# Patient Record
Sex: MALE | Race: WHITE | Employment: FULL TIME | URBAN - METROPOLITAN AREA
[De-identification: names, ages, dates, MRNs, and addresses within clinical notes are randomized per-mention and may not be internally consistent; named-entity substitution may affect disease eponyms.]

---

## 2017-02-16 ENCOUNTER — OFFICE VISIT (OUTPATIENT)
Dept: FAMILY MEDICINE CLINIC | Facility: CLINIC | Age: 32
End: 2017-02-16

## 2017-02-16 VITALS
BODY MASS INDEX: 22.19 KG/M2 | HEIGHT: 72.4 IN | SYSTOLIC BLOOD PRESSURE: 100 MMHG | HEART RATE: 76 BPM | RESPIRATION RATE: 14 BRPM | WEIGHT: 165.63 LBS | DIASTOLIC BLOOD PRESSURE: 70 MMHG

## 2017-02-16 DIAGNOSIS — F41.9 ANXIETY: ICD-10-CM

## 2017-02-16 DIAGNOSIS — G47.9 SLEEP TROUBLE: Primary | ICD-10-CM

## 2017-02-16 PROCEDURE — 99214 OFFICE O/P EST MOD 30 MIN: CPT | Performed by: FAMILY MEDICINE

## 2017-02-16 RX ORDER — TEMAZEPAM 15 MG/1
15 CAPSULE ORAL NIGHTLY PRN
Qty: 30 CAPSULE | Refills: 2 | Status: SHIPPED | OUTPATIENT
Start: 2017-02-16 | End: 2017-10-18

## 2017-02-16 NOTE — PROGRESS NOTES
Patient presents with: Insomnia: started Ambien- not working - still hacing trouble staying asleep    HPI:   Chetna Mc is a 28year old male who presents to the office for continued sleeping issues. Has been about 4 years now.   Started wit trouble  Ineffective other meds  Trial of temazepam, which may help address some of the anxiety. RTC 3 months if working for refill. If not effective, may need therapist/couseling specifically for sleep  - temazepam 15 MG Oral Cap;  Take 1 capsule (15 m

## 2017-05-04 ENCOUNTER — OFFICE VISIT (OUTPATIENT)
Dept: FAMILY MEDICINE CLINIC | Facility: CLINIC | Age: 32
End: 2017-05-04

## 2017-05-04 VITALS
SYSTOLIC BLOOD PRESSURE: 118 MMHG | HEART RATE: 78 BPM | WEIGHT: 172 LBS | TEMPERATURE: 98 F | DIASTOLIC BLOOD PRESSURE: 76 MMHG | RESPIRATION RATE: 16 BRPM | HEIGHT: 73 IN | BODY MASS INDEX: 22.8 KG/M2 | OXYGEN SATURATION: 98 %

## 2017-05-04 DIAGNOSIS — R09.82 POST-NASAL DRIP: ICD-10-CM

## 2017-05-04 DIAGNOSIS — R05.9 COUGH: ICD-10-CM

## 2017-05-04 DIAGNOSIS — J34.89 SINUS PRESSURE: Primary | ICD-10-CM

## 2017-05-04 PROCEDURE — 99213 OFFICE O/P EST LOW 20 MIN: CPT | Performed by: NURSE PRACTITIONER

## 2017-05-04 RX ORDER — FLUTICASONE PROPIONATE 50 MCG
1 SPRAY, SUSPENSION (ML) NASAL 2 TIMES DAILY
Qty: 1 BOTTLE | Refills: 1 | Status: SHIPPED | OUTPATIENT
Start: 2017-05-04 | End: 2017-06-03

## 2017-05-04 NOTE — PROGRESS NOTES
HPI:   Arjun Fitzgerald is a 28year old male who presents with ill symptoms for  2  weeks.  Patient reports sore throat, congestion, yellow/green colored nasal discharge, cough with clear colored sputum, cough is not keeping pt up at night, ear caleb developed, well nourished,in no apparent distress  HEENT: atraumatic, normocephalic,ears full and clear, nares with minimal mucus, sniffling often, throat with no erythema or edema. No sinus tenderness with palpation.   NECK: supple,no adenopathy  LUNGS: cl

## 2017-05-04 NOTE — PATIENT INSTRUCTIONS
· Please start over the counter Allegra or Claritin daily (generic okay). Use for at least one week to help with allergies. If improved may stop; if symptoms return please restart.   Please start Flonase 1 spray each nostril twice daily before brushing teet

## 2017-10-18 ENCOUNTER — HOSPITAL ENCOUNTER (OUTPATIENT)
Age: 32
Discharge: HOME | End: 2017-10-18
Attending: FAMILY MEDICINE
Payer: COMMERCIAL

## 2017-10-18 ENCOUNTER — OFFICE VISIT (OUTPATIENT)
Dept: FAMILY MEDICINE CLINIC | Facility: CLINIC | Age: 32
End: 2017-10-18

## 2017-10-18 VITALS
TEMPERATURE: 98 F | SYSTOLIC BLOOD PRESSURE: 120 MMHG | HEART RATE: 70 BPM | DIASTOLIC BLOOD PRESSURE: 83 MMHG | OXYGEN SATURATION: 100 % | WEIGHT: 171.94 LBS | RESPIRATION RATE: 16 BRPM | BODY MASS INDEX: 23 KG/M2

## 2017-10-18 DIAGNOSIS — L98.9 SKIN LESION: Primary | ICD-10-CM

## 2017-10-18 DIAGNOSIS — A69.20 ERYTHEMA MIGRANS (LYME DISEASE): Primary | ICD-10-CM

## 2017-10-18 PROCEDURE — 99213 OFFICE O/P EST LOW 20 MIN: CPT

## 2017-10-18 PROCEDURE — 99214 OFFICE O/P EST MOD 30 MIN: CPT

## 2017-10-18 RX ORDER — DOXYCYCLINE HYCLATE 100 MG/1
100 CAPSULE ORAL 2 TIMES DAILY
Qty: 42 CAPSULE | Refills: 0 | Status: SHIPPED | OUTPATIENT
Start: 2017-10-18 | End: 2017-11-08

## 2017-10-18 NOTE — ED PROVIDER NOTES
Patient Seen in: [facility]    History   Patient presents with:  Bite Sting,Insect (integumentary)    Stated Complaint: insect bites x2 weeks    HPI  29 yo M here with complaints of insect bites - small black oval shaped bu 98.3 °F (36.8 °C)  Temp src: Oral  SpO2: 100 %  O2 Device: None (Room air)    Current:/83   Pulse 70   Temp 98.3 °F (36.8 °C) (Oral)   Resp 16   Wt 78 kg   SpO2 100%   BMI 22.69 kg/m²         Physical Exam    GEN: Not in any acute distress, making go indicated considering you have no other symptoms     Start taking probiotics - OTC Culturelle / Florastor to avoid any GI distress from the antibiotics       Follow up with PMD in the next 5-7 days           Disposition and Plan     Clinical Impression:  E

## 2017-10-18 NOTE — PROGRESS NOTES
CHIEF COMPLAINT:   Patient presents with:  Derm Problem: bug bite     HPI:   Robinson Alvarez is a 28year old male who presents for evaluation of a bug bite. Per patient bug bites started 2 weeks ago.  These 3 bug bites have now developed rings ar numbness. EXAM:   There were no vitals taken for this visit.   GENERAL: well developed, well nourished,in no apparent distress  SKIN: right forearm 2 papular lesions raised erythematous defined circular boarders, 1 similar lesion to dorsum of left fore

## 2017-10-18 NOTE — ED INITIAL ASSESSMENT (HPI)
[place] approx 2 weeks ago, noticed insect on r hand, brushed it off states bite was painful but did not notice to be tick,  Noticed bites to r forearm & left elbow day or 2 later, now has rings around all 3 marks.   No oral

## 2017-10-31 ENCOUNTER — PATIENT MESSAGE (OUTPATIENT)
Dept: FAMILY MEDICINE CLINIC | Facility: CLINIC | Age: 32
End: 2017-10-31

## 2017-10-31 NOTE — TELEPHONE ENCOUNTER
From: Nathalia Arevalo  To: Jessica Henson MD  Sent: 10/31/2017 8:04 AM CDT  Subject: Prescription Question    Hi Doc,    I was prescribed doxycycline (100mg, 2x/day for 21 days) on 10/18 for lymes, presenting only with the \"target\" rash (no f

## 2018-06-11 ENCOUNTER — OFFICE VISIT (OUTPATIENT)
Dept: FAMILY MEDICINE CLINIC | Facility: CLINIC | Age: 33
End: 2018-06-11

## 2018-06-11 VITALS
OXYGEN SATURATION: 98 % | RESPIRATION RATE: 14 BRPM | HEART RATE: 84 BPM | TEMPERATURE: 98 F | SYSTOLIC BLOOD PRESSURE: 110 MMHG | DIASTOLIC BLOOD PRESSURE: 68 MMHG | WEIGHT: 165 LBS | HEIGHT: 74 IN | BODY MASS INDEX: 21.17 KG/M2

## 2018-06-11 DIAGNOSIS — J34.89 NASAL VESTIBULITIS: Primary | ICD-10-CM

## 2018-06-11 PROCEDURE — 99213 OFFICE O/P EST LOW 20 MIN: CPT | Performed by: NURSE PRACTITIONER

## 2018-06-11 NOTE — PATIENT INSTRUCTIONS
Avoid touching area  May use a Q-tip when applying Mupirocin   Mupirocin as prescribed  May use cool compresses or ibuprofen for discomfort. If inflammation gets worse, follow-up with PCP or clinic.

## 2018-06-11 NOTE — PROGRESS NOTES
CHIEF COMPLAINT:   Patient presents with:  Swelling: pt c/o tenderness and swelling in nose x  2dys      HPI:   Chetna Mc is a 33year old male who presents for complaints of tenderness and swelling inside right nares for  2 days.   Bethel normocephalic.  no tenderness on palpation of sinuses  EYES: conjunctiva clear, EOM intact  EARS: TM's clear, no bulging, no retraction, no fluid, bony landmarks visualized. NOSE: Nostrils patent, right nares with 3 erythematous ulcers on septal side.

## 2019-12-16 ENCOUNTER — OFFICE VISIT (OUTPATIENT)
Dept: FAMILY MEDICINE CLINIC | Facility: CLINIC | Age: 34
End: 2019-12-16
Payer: COMMERCIAL

## 2019-12-16 VITALS
DIASTOLIC BLOOD PRESSURE: 68 MMHG | HEART RATE: 76 BPM | RESPIRATION RATE: 16 BRPM | SYSTOLIC BLOOD PRESSURE: 116 MMHG | HEIGHT: 74 IN | OXYGEN SATURATION: 97 % | BODY MASS INDEX: 23.23 KG/M2 | WEIGHT: 181 LBS | TEMPERATURE: 98 F

## 2019-12-16 DIAGNOSIS — B35.4 TINEA CORPORIS: Primary | ICD-10-CM

## 2019-12-16 PROCEDURE — 99213 OFFICE O/P EST LOW 20 MIN: CPT | Performed by: NURSE PRACTITIONER

## 2019-12-16 NOTE — PROGRESS NOTES
CHIEF COMPLAINT:   Patient presents with:  Rash: rash on left hand ( 1 month)          HPI:    Mayra Mendes is a 29year old male who presents for evaluation of a rash. Per patient rash started in the past 1  months.  Rash has been persistent s HEENT: Denies rhinorrhea, edema of the lips or swelling of throat. CARDIOVASCULAR: Denies chest pains or palpitations. LUNGS: Denies shortness of breath with exertion or rest. No cough or wheezing. LYMPH: Denies enlargement of the lymph nodes.     EXAM: The medical term for ringworm is tinea. It can affect most parts of your body, although it seems to do better in moist areas of the body and around hair.  It can be on almost any part of your body, including:  · Arms, hands, legs, chest, feet, and back  · S · Keep it from spreading to others. Untreated ringworm of the skin is contagious by skin-to-skin contact. Your child may return to school 2 days after treatment has started.   Prevention  To some degree, prevention depends on what part of your body was affe

## 2020-01-13 ENCOUNTER — TELEPHONE (OUTPATIENT)
Dept: FAMILY MEDICINE CLINIC | Facility: CLINIC | Age: 35
End: 2020-01-13

## 2020-01-13 NOTE — TELEPHONE ENCOUNTER
Pt requesting old medical records that shows pt's signature on HIPPA form or facesheet. Put signed forms in bottom drawer for 2014, & 2015. Pt will  information from the office.

## 2020-01-29 ENCOUNTER — OFFICE VISIT (OUTPATIENT)
Dept: FAMILY MEDICINE CLINIC | Facility: CLINIC | Age: 35
End: 2020-01-29
Payer: COMMERCIAL

## 2020-01-29 VITALS
HEIGHT: 74 IN | SYSTOLIC BLOOD PRESSURE: 104 MMHG | RESPIRATION RATE: 16 BRPM | TEMPERATURE: 98 F | DIASTOLIC BLOOD PRESSURE: 70 MMHG | WEIGHT: 179 LBS | BODY MASS INDEX: 22.97 KG/M2 | HEART RATE: 88 BPM

## 2020-01-29 DIAGNOSIS — B35.4 TINEA CORPORIS: Primary | ICD-10-CM

## 2020-01-29 PROCEDURE — 99213 OFFICE O/P EST LOW 20 MIN: CPT | Performed by: FAMILY MEDICINE

## 2020-01-29 RX ORDER — ZOLPIDEM TARTRATE 6.25 MG/1
6.25 TABLET, FILM COATED, EXTENDED RELEASE ORAL AS NEEDED
COMMUNITY
Start: 2020-01-10 | End: 2021-09-13

## 2020-01-29 RX ORDER — CLOTRIMAZOLE AND BETAMETHASONE DIPROPIONATE 10; .64 MG/G; MG/G
1 CREAM TOPICAL 2 TIMES DAILY PRN
Qty: 60 G | Refills: 1 | Status: SHIPPED | OUTPATIENT
Start: 2020-01-29 | End: 2020-08-25

## 2020-01-29 RX ORDER — HYDROXYZINE HYDROCHLORIDE 25 MG/1
25 TABLET, FILM COATED ORAL AS NEEDED
COMMUNITY
Start: 2020-01-10 | End: 2021-09-13

## 2020-01-29 RX ORDER — TERBINAFINE HYDROCHLORIDE 250 MG/1
250 TABLET ORAL DAILY
Qty: 21 TABLET | Refills: 0 | Status: SHIPPED | OUTPATIENT
Start: 2020-01-29 | End: 2020-02-19

## 2020-01-29 NOTE — PROGRESS NOTES
Patient presents with:  Derm Problem: Pt c/o itchy, dry rash on tops of both hands. HPI:   Christopher Velarde is a 36year old male who presents to the office for eval of rash on his hands. H/o ring worm on hand.   Given econazole cream and it

## 2020-07-22 ENCOUNTER — LAB ENCOUNTER (OUTPATIENT)
Dept: LAB | Facility: HOSPITAL | Age: 35
End: 2020-07-22
Attending: NURSE PRACTITIONER
Payer: COMMERCIAL

## 2020-07-22 ENCOUNTER — TELEMEDICINE (OUTPATIENT)
Dept: TELEHEALTH | Age: 35
End: 2020-07-22

## 2020-07-22 DIAGNOSIS — Z20.822 SUSPECTED COVID-19 VIRUS INFECTION: ICD-10-CM

## 2020-07-22 DIAGNOSIS — Z20.822 SUSPECTED COVID-19 VIRUS INFECTION: Primary | ICD-10-CM

## 2020-07-22 PROCEDURE — 99213 OFFICE O/P EST LOW 20 MIN: CPT | Performed by: NURSE PRACTITIONER

## 2020-07-22 NOTE — PATIENT INSTRUCTIONS
Coronavirus Disease 2019 (COVID-19)     [place] [address] is committed to the safety and well-being of our patients, members, employees, and communities.  As concerns arise about the new strain of coronavirus that causes COVID-19, [place] [address] your household, like dishes, towels, and bedding   10. Clean all surfaces that are touched often, like counters, tabletops, and doorknobs. Use household cleaning sprays or wipes according to the label instructions.       Patients with confirmed COVID-19 ann marie symptoms started, OR until 72 hours after your fever is gone and symptoms are getting better, whichever is longer.     Additional Information      You can also get more information at the following websites:   Centers for Disease Control & Prevention (CDC)

## 2020-07-22 NOTE — PROGRESS NOTES
Morales Cardozo is a 36year old male here today for a telemedicine/video visit. Virtual/Telephone Check-In    Morales Cardozo verbally consented to a [employer] on 07/22/20.   Patient has been referred to the [facility] History   Problem Relation Age of Onset   • Diabetes Paternal Grandfather    • Stroke Paternal Grandfather    • Cancer Neg    • Heart Disease Neg       Social History    Tobacco Use      Smoking status: Former Smoker        Packs/day: 0.25        Years: 10

## 2020-07-24 LAB — SARS-COV-2 RNA RESP QL NAA+PROBE: NOT DETECTED

## 2020-07-28 ENCOUNTER — TELEPHONE (OUTPATIENT)
Dept: FAMILY MEDICINE CLINIC | Facility: CLINIC | Age: 35
End: 2020-07-28

## 2020-07-28 DIAGNOSIS — Z13.220 LIPID SCREENING: Primary | ICD-10-CM

## 2020-07-28 DIAGNOSIS — R53.83 FATIGUE, UNSPECIFIED TYPE: ICD-10-CM

## 2020-07-28 DIAGNOSIS — Z13.21 ENCOUNTER FOR VITAMIN DEFICIENCY SCREENING: ICD-10-CM

## 2020-07-28 DIAGNOSIS — F41.1 ANXIETY STATE: ICD-10-CM

## 2020-07-28 NOTE — TELEPHONE ENCOUNTER
Vit D typically does not reflect energy or fatigue  I can order, will need to find out if insurance will cover the testing. Labs have been ordered. Please get them fasting prior to the appt.   thanks

## 2020-07-28 NOTE — TELEPHONE ENCOUNTER
Please enter lab orders for the patient's upcoming physical appointment. Physical scheduled:    Your appointments     Date & Time Appointment Department [facility])    Aug 25, 2020  8:30 AM CDT Physical - Established with Alcira Mars MD [address] [place] [address]

## 2020-07-28 NOTE — TELEPHONE ENCOUNTER
Spoke to patient to let him know and patient stated he did not specify he needed a Vitamin \"D\" test.    Pt wants to know what additional labs should be ordered to determine why his energy levels are low and why he feels fatigued all the time.  Patient sta

## 2020-07-28 NOTE — TELEPHONE ENCOUNTER
Labs ordered to do a workup for his symptoms. He will jie to be fasting, but can get anytime.   Preferably a few days prior to our vist.

## 2020-07-29 ENCOUNTER — PATIENT MESSAGE (OUTPATIENT)
Dept: FAMILY MEDICINE CLINIC | Facility: CLINIC | Age: 35
End: 2020-07-29

## 2020-07-29 NOTE — TELEPHONE ENCOUNTER
Explained to Pt what labs were ordered and that those labs will give us a good picture of possible reasons for symptoms and that other test can be done pending the results. CBC, CMP, Lipid.  Vit D and TSH were ordered    Pt verbalized understanding

## 2020-07-29 NOTE — TELEPHONE ENCOUNTER
Called patient  And notified, patient was upset states this was not what he was asking, states will send Dr Doe Dates a Telanetixt message.

## 2020-07-29 NOTE — TELEPHONE ENCOUNTER
From: Jann Aguilera  To: Kenan Walker MD  Sent: 7/29/2020 8:59 AM CDT  Subject: Other    Hi Dr Jason Loyd,    I think there's been a miscommunication regarding my request for blood work.     I would like to get the blood work done asap since now

## 2020-07-31 ENCOUNTER — LAB ENCOUNTER (OUTPATIENT)
Dept: LAB | Age: 35
End: 2020-07-31
Attending: FAMILY MEDICINE
Payer: COMMERCIAL

## 2020-07-31 DIAGNOSIS — Z13.21 ENCOUNTER FOR VITAMIN DEFICIENCY SCREENING: ICD-10-CM

## 2020-07-31 DIAGNOSIS — Z13.220 LIPID SCREENING: ICD-10-CM

## 2020-07-31 DIAGNOSIS — R53.83 FATIGUE, UNSPECIFIED TYPE: ICD-10-CM

## 2020-07-31 LAB
ALBUMIN SERPL-MCNC: 4.6 G/DL (ref 3.4–5)
ALBUMIN/GLOB SERPL: 1.2 {RATIO} (ref 1–2)
ALP LIVER SERPL-CCNC: 56 U/L (ref 45–117)
ALT SERPL-CCNC: 40 U/L (ref 16–61)
ANION GAP SERPL CALC-SCNC: 4 MMOL/L (ref 0–18)
AST SERPL-CCNC: 19 U/L (ref 15–37)
BASOPHILS # BLD AUTO: 0.07 X10(3) UL (ref 0–0.2)
BASOPHILS NFR BLD AUTO: 0.9 %
BILIRUB SERPL-MCNC: 0.6 MG/DL (ref 0.1–2)
BUN BLD-MCNC: 13 MG/DL (ref 7–18)
BUN/CREAT SERPL: 14 (ref 10–20)
CALCIUM BLD-MCNC: 9.4 MG/DL (ref 8.5–10.1)
CHLORIDE SERPL-SCNC: 107 MMOL/L (ref 98–112)
CHOLEST SMN-MCNC: 167 MG/DL (ref ?–200)
CO2 SERPL-SCNC: 26 MMOL/L (ref 21–32)
CREAT BLD-MCNC: 0.93 MG/DL (ref 0.7–1.3)
DEPRECATED RDW RBC AUTO: 42.5 FL (ref 35.1–46.3)
EOSINOPHIL # BLD AUTO: 0.18 X10(3) UL (ref 0–0.7)
EOSINOPHIL NFR BLD AUTO: 2.3 %
ERYTHROCYTE [DISTWIDTH] IN BLOOD BY AUTOMATED COUNT: 12.5 % (ref 11–15)
GLOBULIN PLAS-MCNC: 3.8 G/DL (ref 2.8–4.4)
GLUCOSE BLD-MCNC: 85 MG/DL (ref 70–99)
HCT VFR BLD AUTO: 46.7 % (ref 39–53)
HDLC SERPL-MCNC: 37 MG/DL (ref 40–59)
HGB BLD-MCNC: 15.7 G/DL (ref 13–17.5)
IMM GRANULOCYTES # BLD AUTO: 0.04 X10(3) UL (ref 0–1)
IMM GRANULOCYTES NFR BLD: 0.5 %
LDLC SERPL CALC-MCNC: 92 MG/DL (ref ?–100)
LYMPHOCYTES # BLD AUTO: 2.8 X10(3) UL (ref 1–4)
LYMPHOCYTES NFR BLD AUTO: 35.1 %
M PROTEIN MFR SERPL ELPH: 8.4 G/DL (ref 6.4–8.2)
MCH RBC QN AUTO: 31.1 PG (ref 26–34)
MCHC RBC AUTO-ENTMCNC: 33.6 G/DL (ref 31–37)
MCV RBC AUTO: 92.5 FL (ref 80–100)
MONOCYTES # BLD AUTO: 0.67 X10(3) UL (ref 0.1–1)
MONOCYTES NFR BLD AUTO: 8.4 %
NEUTROPHILS # BLD AUTO: 4.21 X10 (3) UL (ref 1.5–7.7)
NEUTROPHILS # BLD AUTO: 4.21 X10(3) UL (ref 1.5–7.7)
NEUTROPHILS NFR BLD AUTO: 52.8 %
NONHDLC SERPL-MCNC: 130 MG/DL (ref ?–130)
OSMOLALITY SERPL CALC.SUM OF ELEC: 283 MOSM/KG (ref 275–295)
PATIENT FASTING Y/N/NP: YES
PATIENT FASTING Y/N/NP: YES
PLATELET # BLD AUTO: 278 10(3)UL (ref 150–450)
POTASSIUM SERPL-SCNC: 4.2 MMOL/L (ref 3.5–5.1)
RBC # BLD AUTO: 5.05 X10(6)UL (ref 4.3–5.7)
SODIUM SERPL-SCNC: 137 MMOL/L (ref 136–145)
TRIGL SERPL-MCNC: 189 MG/DL (ref 30–149)
TSI SER-ACNC: 1.05 MIU/ML (ref 0.36–3.74)
VIT D+METAB SERPL-MCNC: 28.9 NG/ML (ref 30–100)
VLDLC SERPL CALC-MCNC: 38 MG/DL (ref 0–30)
WBC # BLD AUTO: 8 X10(3) UL (ref 4–11)

## 2020-07-31 PROCEDURE — 80050 GENERAL HEALTH PANEL: CPT | Performed by: FAMILY MEDICINE

## 2020-07-31 PROCEDURE — 82306 VITAMIN D 25 HYDROXY: CPT | Performed by: FAMILY MEDICINE

## 2020-07-31 PROCEDURE — 80061 LIPID PANEL: CPT | Performed by: FAMILY MEDICINE

## 2020-08-25 ENCOUNTER — OFFICE VISIT (OUTPATIENT)
Dept: FAMILY MEDICINE CLINIC | Facility: CLINIC | Age: 35
End: 2020-08-25
Payer: COMMERCIAL

## 2020-08-25 VITALS
RESPIRATION RATE: 20 BRPM | DIASTOLIC BLOOD PRESSURE: 72 MMHG | TEMPERATURE: 98 F | BODY MASS INDEX: 22.29 KG/M2 | SYSTOLIC BLOOD PRESSURE: 124 MMHG | WEIGHT: 168.19 LBS | HEIGHT: 72.75 IN | HEART RATE: 96 BPM

## 2020-08-25 DIAGNOSIS — Z00.00 ANNUAL PHYSICAL EXAM: Primary | ICD-10-CM

## 2020-08-25 PROCEDURE — 3008F BODY MASS INDEX DOCD: CPT | Performed by: FAMILY MEDICINE

## 2020-08-25 PROCEDURE — 99395 PREV VISIT EST AGE 18-39: CPT | Performed by: FAMILY MEDICINE

## 2020-08-25 PROCEDURE — 3074F SYST BP LT 130 MM HG: CPT | Performed by: FAMILY MEDICINE

## 2020-08-25 PROCEDURE — 3078F DIAST BP <80 MM HG: CPT | Performed by: FAMILY MEDICINE

## 2020-08-25 NOTE — PROGRESS NOTES
Patient presents with:  Physical: Annual Exam. Review recent     HPI:   Mayra Mendes is a 36year old male who presents for a complete physical exam.     Last colonoscopy:  N/a - at 51  Last PSA:  N/a - at 49. Immunizations: TDaP 2016.       Pat Oakley Laterality Date   • OTHER SURGICAL HISTORY  12/2014    wisdom tooth/teeth & left side tooth extraction mid Dec. 2014      Family History   Problem Relation Age of Onset   • Diabetes Paternal Grandfather    • Stroke Paternal Grandfather    • Cancer Neg    • symmetric reflexes. Normal coordination and gait     ASSESSMENT AND PLAN:     Skinny Gonzalez was seen in the office today:  had concerns including Physical (Annual Exam. Review recent).     1. Annual physical exam  Overall well  No clear cause for

## 2021-04-13 ENCOUNTER — HOSPITAL ENCOUNTER (EMERGENCY)
Age: 36
Discharge: HOME | End: 2021-04-13
Attending: EMERGENCY MEDICINE
Payer: COMMERCIAL

## 2021-04-13 VITALS
WEIGHT: 160 LBS | TEMPERATURE: 99 F | HEART RATE: 84 BPM | SYSTOLIC BLOOD PRESSURE: 114 MMHG | RESPIRATION RATE: 16 BRPM | BODY MASS INDEX: 20.53 KG/M2 | DIASTOLIC BLOOD PRESSURE: 64 MMHG | HEIGHT: 74 IN | OXYGEN SATURATION: 100 %

## 2021-04-13 DIAGNOSIS — R19.7 NAUSEA VOMITING AND DIARRHEA: Primary | ICD-10-CM

## 2021-04-13 DIAGNOSIS — R11.2 NAUSEA VOMITING AND DIARRHEA: Primary | ICD-10-CM

## 2021-04-13 PROCEDURE — 96361 HYDRATE IV INFUSION ADD-ON: CPT

## 2021-04-13 PROCEDURE — 87046 STOOL CULTR AEROBIC BACT EA: CPT | Performed by: EMERGENCY MEDICINE

## 2021-04-13 PROCEDURE — 80053 COMPREHEN METABOLIC PANEL: CPT | Performed by: EMERGENCY MEDICINE

## 2021-04-13 PROCEDURE — 96374 THER/PROPH/DIAG INJ IV PUSH: CPT

## 2021-04-13 PROCEDURE — 85025 COMPLETE CBC W/AUTO DIFF WBC: CPT | Performed by: EMERGENCY MEDICINE

## 2021-04-13 PROCEDURE — 99284 EMERGENCY DEPT VISIT MOD MDM: CPT

## 2021-04-13 PROCEDURE — 87045 FECES CULTURE AEROBIC BACT: CPT | Performed by: EMERGENCY MEDICINE

## 2021-04-13 PROCEDURE — 82272 OCCULT BLD FECES 1-3 TESTS: CPT | Performed by: EMERGENCY MEDICINE

## 2021-04-13 PROCEDURE — 87493 C DIFF AMPLIFIED PROBE: CPT | Performed by: EMERGENCY MEDICINE

## 2021-04-13 PROCEDURE — 87427 SHIGA-LIKE TOXIN AG IA: CPT | Performed by: EMERGENCY MEDICINE

## 2021-04-13 RX ORDER — ONDANSETRON 4 MG/1
4 TABLET, ORALLY DISINTEGRATING ORAL EVERY 8 HOURS PRN
Qty: 10 TABLET | Refills: 0 | Status: SHIPPED | OUTPATIENT
Start: 2021-04-13 | End: 2021-04-20

## 2021-04-13 RX ORDER — ONDANSETRON 2 MG/ML
4 INJECTION INTRAMUSCULAR; INTRAVENOUS ONCE
Status: COMPLETED | OUTPATIENT
Start: 2021-04-13 | End: 2021-04-13

## 2021-04-13 NOTE — ED PROVIDER NOTES
Patient Seen in: THE [facility] [place]      History   Patient presents with:  Nausea/Vomiting/Diarrhea    Stated Complaint: nausea diarrhea body aches and chills for 12 hours     HPI/Subjective:   HPI    The patient is a 29-year-old m nausea diarrhea body aches and chills for 12 hours   Other systems are as noted in HPI. Constitutional and vital signs reviewed. All other systems reviewed and negative except as noted above.     Physical Exam     ED Triage Vitals [04/13/21 0929]   BP 0.58 (*)     All other components within normal limits   RAPID SARS-COV-2 BY PCR - Normal   CBC WITH DIFFERENTIAL WITH PLATELET    Narrative: The following orders were created for panel order CBC WITH DIFFERENTIAL WITH PLATELET.   Procedure hydrated. He is to return if he develops pain. Otherwise he is to follow-up with primary care as an outpatient. He was comfortable going home and is discharged home in stable condition.                              Disposition and Plan     Clinical Impre

## 2021-04-13 NOTE — ED INITIAL ASSESSMENT (HPI)
Started last night suddenly with nausea, diarrhea 2-3/hours, watery, no recent antibiotics, fever/body aches

## 2021-04-15 ENCOUNTER — TELEPHONE (OUTPATIENT)
Dept: FAMILY MEDICINE CLINIC | Facility: CLINIC | Age: 36
End: 2021-04-15

## 2021-08-13 ENCOUNTER — TELEPHONE (OUTPATIENT)
Dept: FAMILY MEDICINE CLINIC | Facility: CLINIC | Age: 36
End: 2021-08-13

## 2021-08-13 DIAGNOSIS — Z13.220 LIPID SCREENING: Primary | ICD-10-CM

## 2021-08-13 NOTE — TELEPHONE ENCOUNTER
Please enter lab orders for the patient's upcoming physical appointment. Physical scheduled:    Your appointments     Date & Time Appointment Department [facility])    Sep 24, 2021  8:00 AM CDT Adult Physical with Kacie Abbasi  [facility],

## 2021-08-16 ENCOUNTER — LAB ENCOUNTER (OUTPATIENT)
Dept: LAB | Age: 36
End: 2021-08-16
Attending: FAMILY MEDICINE
Payer: COMMERCIAL

## 2021-08-16 DIAGNOSIS — Z13.220 LIPID SCREENING: ICD-10-CM

## 2021-08-16 LAB
ALBUMIN SERPL-MCNC: 4.3 G/DL (ref 3.4–5)
ALBUMIN/GLOB SERPL: 1.3 {RATIO} (ref 1–2)
ALP LIVER SERPL-CCNC: 46 U/L
ALT SERPL-CCNC: 37 U/L
ANION GAP SERPL CALC-SCNC: 4 MMOL/L (ref 0–18)
AST SERPL-CCNC: 19 U/L (ref 15–37)
BILIRUB SERPL-MCNC: 0.5 MG/DL (ref 0.1–2)
BUN BLD-MCNC: 16 MG/DL (ref 7–18)
CALCIUM BLD-MCNC: 9.4 MG/DL (ref 8.5–10.1)
CHLORIDE SERPL-SCNC: 108 MMOL/L (ref 98–112)
CHOLEST SMN-MCNC: 172 MG/DL (ref ?–200)
CO2 SERPL-SCNC: 28 MMOL/L (ref 21–32)
CREAT BLD-MCNC: 0.77 MG/DL
GLOBULIN PLAS-MCNC: 3.4 G/DL (ref 2.8–4.4)
GLUCOSE BLD-MCNC: 84 MG/DL (ref 70–99)
HDLC SERPL-MCNC: 38 MG/DL (ref 40–59)
LDLC SERPL CALC-MCNC: 96 MG/DL (ref ?–100)
M PROTEIN MFR SERPL ELPH: 7.7 G/DL (ref 6.4–8.2)
NONHDLC SERPL-MCNC: 134 MG/DL (ref ?–130)
OSMOLALITY SERPL CALC.SUM OF ELEC: 290 MOSM/KG (ref 275–295)
PATIENT FASTING Y/N/NP: YES
PATIENT FASTING Y/N/NP: YES
POTASSIUM SERPL-SCNC: 4.1 MMOL/L (ref 3.5–5.1)
SODIUM SERPL-SCNC: 140 MMOL/L (ref 136–145)
TRIGL SERPL-MCNC: 225 MG/DL (ref 30–149)
VLDLC SERPL CALC-MCNC: 37 MG/DL (ref 0–30)

## 2021-08-16 PROCEDURE — 80061 LIPID PANEL: CPT | Performed by: FAMILY MEDICINE

## 2021-08-16 PROCEDURE — 80053 COMPREHEN METABOLIC PANEL: CPT | Performed by: FAMILY MEDICINE

## 2021-09-13 ENCOUNTER — OFFICE VISIT (OUTPATIENT)
Dept: FAMILY MEDICINE CLINIC | Facility: CLINIC | Age: 36
End: 2021-09-13
Payer: COMMERCIAL

## 2021-09-13 ENCOUNTER — TELEPHONE (OUTPATIENT)
Dept: FAMILY MEDICINE CLINIC | Facility: CLINIC | Age: 36
End: 2021-09-13

## 2021-09-13 VITALS
DIASTOLIC BLOOD PRESSURE: 76 MMHG | RESPIRATION RATE: 14 BRPM | WEIGHT: 173 LBS | HEIGHT: 73.4 IN | BODY MASS INDEX: 22.68 KG/M2 | SYSTOLIC BLOOD PRESSURE: 126 MMHG | HEART RATE: 72 BPM

## 2021-09-13 DIAGNOSIS — F41.1 ANXIETY STATE: ICD-10-CM

## 2021-09-13 DIAGNOSIS — Z00.00 ANNUAL PHYSICAL EXAM: Primary | ICD-10-CM

## 2021-09-13 PROCEDURE — 3074F SYST BP LT 130 MM HG: CPT | Performed by: FAMILY MEDICINE

## 2021-09-13 PROCEDURE — 99395 PREV VISIT EST AGE 18-39: CPT | Performed by: FAMILY MEDICINE

## 2021-09-13 PROCEDURE — 3008F BODY MASS INDEX DOCD: CPT | Performed by: FAMILY MEDICINE

## 2021-09-13 PROCEDURE — 3078F DIAST BP <80 MM HG: CPT | Performed by: FAMILY MEDICINE

## 2021-09-13 RX ORDER — ZOLPIDEM TARTRATE 6.25 MG/1
6.25 TABLET, FILM COATED, EXTENDED RELEASE ORAL AS NEEDED
Qty: 30 TABLET | Refills: 1 | Status: SHIPPED | OUTPATIENT
Start: 2021-09-13

## 2021-09-13 RX ORDER — HYDROXYZINE HYDROCHLORIDE 25 MG/1
25 TABLET, FILM COATED ORAL 2 TIMES DAILY PRN
Qty: 60 TABLET | Refills: 2 | Status: SHIPPED | OUTPATIENT
Start: 2021-09-13

## 2021-09-13 NOTE — PROGRESS NOTES
Patient presents with:  Physical: annual   Medication Request: zolpidem   Lab: discuss blood work results      HPI:   Jann Aguilera is a 30year old male who presents for a complete physical exam.     Last colonoscopy:  N/a - screening at 40yo. HISTORY  12/2014    wisdom tooth/teeth & left side tooth extraction mid Dec. 2014      Family History   Problem Relation Age of Onset   • Diabetes Paternal Grandfather    • Stroke Paternal Grandfather    • Cancer Neg    • Heart Disease Neg       Social His symmetric reflexes. Normal coordination and gait     ASSESSMENT AND PLAN:     Anni Collins was seen in the office today:  had concerns including Physical (annual ), Medication Request (zolpidem ), and Lab (discuss blood work results ).     1. An

## 2021-12-22 ENCOUNTER — NURSE ONLY (OUTPATIENT)
Dept: LAB | Age: 36
End: 2021-12-22
Attending: NURSE PRACTITIONER
Payer: COMMERCIAL

## 2021-12-22 ENCOUNTER — E-VISIT (OUTPATIENT)
Dept: TELEHEALTH | Age: 36
End: 2021-12-22

## 2021-12-22 DIAGNOSIS — J06.9 VIRAL URI: ICD-10-CM

## 2021-12-22 DIAGNOSIS — Z11.52 ENCOUNTER FOR SCREENING FOR COVID-19: ICD-10-CM

## 2021-12-22 DIAGNOSIS — Z11.52 ENCOUNTER FOR SCREENING FOR COVID-19: Primary | ICD-10-CM

## 2021-12-22 PROCEDURE — 99421 OL DIG E/M SVC 5-10 MIN: CPT | Performed by: NURSE PRACTITIONER

## 2021-12-22 NOTE — PROGRESS NOTES
Morales Cardozo is a 30year old male. HPI:   See answers to questions above.      Current Outpatient Medications   Medication Sig Dispense Refill   • hydrOXYzine 25 MG Oral Tab Take 1 tablet (25 mg total) by mouth 2 (two) times daily as needed fo

## 2021-12-22 NOTE — PATIENT INSTRUCTIONS
Coronavirus Disease 2019 (COVID-19)     [facility] is committed to the safety and well-being of our patients, members, employees, and communities.  As concerns arise about the new strain of coronavirus that causes COVID-19, [facility] exposure  • After day 7 from date of last exposure with a negative test result (test must occur on day 5 or later)  After stopping quarantine, you should  • Watch for symptoms until 14 days after exposure.   • If you have symptoms, immediately self-isolate Care     If you are awaiting test results or are confirmed positive for COVID -19, and your symptoms worsen at home with symptoms such as: extreme weakness, difficult breathing, or unrelenting fevers greater than 100.4 degrees Fahrenheit, you should contac Follow-up  If you are diagnosed with COVID, refrain from exercise until approved by your primary care provider. Please call your primary care provider within 2 days of your discharge to arrange for a telehealth follow-up.  CDC does not recommend repeat test Control & Prevention (CDC)  10 things you can do to manage your health at home, [employer].[email or web address]. pdf  [email or web address] Retrieved March 17, 2021, from [email or web address]/covid-19-information/covid-19-long-haulers. html  Long-term effects of covid-19. (n.d.).  Retrieved May 11, 2021, from [email or web address] (water, soft drinks, juices, tea, or soup). Extra fluids will help loosen secretions in the nose and lungs.   · Over-the-counter cold medicines will not shorten the length of time you’re sick, but they may be helpful for the following symptoms: cough, sore

## 2022-01-26 DIAGNOSIS — F41.1 ANXIETY STATE: ICD-10-CM

## 2022-01-27 RX ORDER — MIRTAZAPINE 15 MG/1
15 TABLET, FILM COATED ORAL NIGHTLY
Qty: 90 TABLET | Refills: 2 | Status: SHIPPED | OUTPATIENT
Start: 2022-01-27 | End: 2022-06-02

## 2022-03-30 ENCOUNTER — NURSE ONLY (OUTPATIENT)
Dept: LAB | Age: 37
End: 2022-03-30
Attending: NURSE PRACTITIONER
Payer: COMMERCIAL

## 2022-03-30 DIAGNOSIS — R50.9 FEVER IN ADULT: ICD-10-CM

## 2022-03-30 DIAGNOSIS — Z20.822 SUSPECTED COVID-19 VIRUS INFECTION: ICD-10-CM

## 2022-03-31 LAB — SARS-COV-2 RNA RESP QL NAA+PROBE: NOT DETECTED

## 2022-07-01 ENCOUNTER — E-VISIT (OUTPATIENT)
Dept: TELEHEALTH | Age: 37
End: 2022-07-01

## 2022-07-01 DIAGNOSIS — R50.9 FEVER IN ADULT: ICD-10-CM

## 2022-07-01 DIAGNOSIS — J02.9 SORE THROAT: Primary | ICD-10-CM

## 2022-07-01 PROCEDURE — 99422 OL DIG E/M SVC 11-20 MIN: CPT | Performed by: NURSE PRACTITIONER

## 2022-07-02 ENCOUNTER — LAB ENCOUNTER (OUTPATIENT)
Dept: LAB | Age: 37
End: 2022-07-02
Attending: NURSE PRACTITIONER
Payer: COMMERCIAL

## 2022-07-02 DIAGNOSIS — J02.9 SORE THROAT: ICD-10-CM

## 2022-07-02 DIAGNOSIS — R50.9 FEVER IN ADULT: ICD-10-CM

## 2022-07-02 LAB — BETA STREP GRP A SCREEN: NEGATIVE

## 2022-07-02 PROCEDURE — 87430 STREP A AG IA: CPT

## 2022-07-03 LAB — SARS-COV-2 RNA RESP QL NAA+PROBE: DETECTED

## 2022-09-10 DIAGNOSIS — F41.1 ANXIETY STATE: ICD-10-CM

## 2022-09-26 ENCOUNTER — TELEPHONE (OUTPATIENT)
Dept: FAMILY MEDICINE CLINIC | Facility: CLINIC | Age: 37
End: 2022-09-26

## 2022-09-26 DIAGNOSIS — F41.1 ANXIETY STATE: ICD-10-CM

## 2022-09-26 DIAGNOSIS — Z13.220 LIPID SCREENING: Primary | ICD-10-CM

## 2022-09-26 RX ORDER — MIRTAZAPINE 15 MG/1
TABLET, FILM COATED ORAL
Qty: 90 TABLET | Refills: 0 | Status: SHIPPED | OUTPATIENT
Start: 2022-09-26

## 2022-09-26 NOTE — TELEPHONE ENCOUNTER
Pt called back so the medication has two entries under the same medication request for Mirtazapine 15 mg and Yun sent it for both. Patient has the medication and only needs one. He said it was refilled end of August. Not sure what is happening with medication. Pt will schedule his physical on line.

## 2022-09-26 NOTE — TELEPHONE ENCOUNTER
Please enter lab orders for the patient's upcoming physical appointment. Physical scheduled: Your appointments     Date & Time Appointment Department [facility])    Nov 21, 2022  9:30 AM CST Adult Physical with Dixie Ortega  [address])    PLEASE NOTE - Most insurances allow a Complete Physical once every 366 days. Please schedule accordingly. Please arrive 15 minutes prior to your scheduled appointment. Please also bring your Insurance card, Photo ID, and your medication bottles or a list of your current medication. If you no longer require this appointment, please contact your physician office to cancel. [place] [address] [facility] [phone]         [facility])     The patient has been notified to complete fasting labs prior to their physical appointment.

## 2022-10-19 ENCOUNTER — LAB ENCOUNTER (OUTPATIENT)
Dept: LAB | Age: 37
End: 2022-10-19
Attending: FAMILY MEDICINE
Payer: COMMERCIAL

## 2022-10-19 DIAGNOSIS — F41.1 ANXIETY STATE: ICD-10-CM

## 2022-10-19 DIAGNOSIS — Z13.220 LIPID SCREENING: ICD-10-CM

## 2022-10-19 LAB
ALBUMIN SERPL-MCNC: 4.2 G/DL (ref 3.4–5)
ALBUMIN/GLOB SERPL: 1.1 {RATIO} (ref 1–2)
ALP LIVER SERPL-CCNC: 46 U/L
ALT SERPL-CCNC: 34 U/L
ANION GAP SERPL CALC-SCNC: 4 MMOL/L (ref 0–18)
AST SERPL-CCNC: 14 U/L (ref 15–37)
BASOPHILS # BLD AUTO: 0.07 X10(3) UL (ref 0–0.2)
BASOPHILS NFR BLD AUTO: 0.9 %
BILIRUB SERPL-MCNC: 0.5 MG/DL (ref 0.1–2)
BUN BLD-MCNC: 17 MG/DL (ref 7–18)
CALCIUM BLD-MCNC: 9.6 MG/DL (ref 8.5–10.1)
CHLORIDE SERPL-SCNC: 107 MMOL/L (ref 98–112)
CHOLEST SERPL-MCNC: 185 MG/DL (ref ?–200)
CO2 SERPL-SCNC: 28 MMOL/L (ref 21–32)
CREAT BLD-MCNC: 0.97 MG/DL
EOSINOPHIL # BLD AUTO: 0.27 X10(3) UL (ref 0–0.7)
EOSINOPHIL NFR BLD AUTO: 3.5 %
ERYTHROCYTE [DISTWIDTH] IN BLOOD BY AUTOMATED COUNT: 13 %
FASTING PATIENT LIPID ANSWER: YES
FASTING STATUS PATIENT QL REPORTED: YES
GFR SERPLBLD BASED ON 1.73 SQ M-ARVRAT: 104 ML/MIN/1.73M2 (ref 60–?)
GLOBULIN PLAS-MCNC: 4 G/DL (ref 2.8–4.4)
GLUCOSE BLD-MCNC: 83 MG/DL (ref 70–99)
HCT VFR BLD AUTO: 46 %
HDLC SERPL-MCNC: 40 MG/DL (ref 40–59)
HGB BLD-MCNC: 15.3 G/DL
IMM GRANULOCYTES # BLD AUTO: 0.04 X10(3) UL (ref 0–1)
IMM GRANULOCYTES NFR BLD: 0.5 %
LDLC SERPL CALC-MCNC: 113 MG/DL (ref ?–100)
LYMPHOCYTES # BLD AUTO: 3.45 X10(3) UL (ref 1–4)
LYMPHOCYTES NFR BLD AUTO: 45 %
MCH RBC QN AUTO: 30.5 PG (ref 26–34)
MCHC RBC AUTO-ENTMCNC: 33.3 G/DL (ref 31–37)
MCV RBC AUTO: 91.6 FL
MONOCYTES # BLD AUTO: 0.55 X10(3) UL (ref 0.1–1)
MONOCYTES NFR BLD AUTO: 7.2 %
NEUTROPHILS # BLD AUTO: 3.28 X10 (3) UL (ref 1.5–7.7)
NEUTROPHILS # BLD AUTO: 3.28 X10(3) UL (ref 1.5–7.7)
NEUTROPHILS NFR BLD AUTO: 42.9 %
NONHDLC SERPL-MCNC: 145 MG/DL (ref ?–130)
OSMOLALITY SERPL CALC.SUM OF ELEC: 289 MOSM/KG (ref 275–295)
PLATELET # BLD AUTO: 273 10(3)UL (ref 150–450)
POTASSIUM SERPL-SCNC: 3.8 MMOL/L (ref 3.5–5.1)
PROT SERPL-MCNC: 8.2 G/DL (ref 6.4–8.2)
RBC # BLD AUTO: 5.02 X10(6)UL
SODIUM SERPL-SCNC: 139 MMOL/L (ref 136–145)
TRIGL SERPL-MCNC: 181 MG/DL (ref 30–149)
TSI SER-ACNC: 1.81 MIU/ML (ref 0.36–3.74)
VLDLC SERPL CALC-MCNC: 31 MG/DL (ref 0–30)
WBC # BLD AUTO: 7.7 X10(3) UL (ref 4–11)

## 2022-10-19 PROCEDURE — 80061 LIPID PANEL: CPT

## 2022-10-19 PROCEDURE — 84443 ASSAY THYROID STIM HORMONE: CPT

## 2022-10-19 PROCEDURE — 85025 COMPLETE CBC W/AUTO DIFF WBC: CPT

## 2022-10-19 PROCEDURE — 80053 COMPREHEN METABOLIC PANEL: CPT

## 2022-10-19 PROCEDURE — 36415 COLL VENOUS BLD VENIPUNCTURE: CPT

## 2022-10-19 RX ORDER — ZOLPIDEM TARTRATE 6.25 MG/1
TABLET, FILM COATED, EXTENDED RELEASE ORAL
Qty: 30 TABLET | Refills: 0 | Status: SHIPPED | OUTPATIENT
Start: 2022-10-19

## 2022-11-15 ENCOUNTER — OFFICE VISIT (OUTPATIENT)
Dept: FAMILY MEDICINE CLINIC | Facility: CLINIC | Age: 37
End: 2022-11-15
Payer: COMMERCIAL

## 2022-11-15 VITALS
TEMPERATURE: 97 F | WEIGHT: 179 LBS | HEIGHT: 74 IN | SYSTOLIC BLOOD PRESSURE: 110 MMHG | RESPIRATION RATE: 16 BRPM | HEART RATE: 77 BPM | BODY MASS INDEX: 22.97 KG/M2 | DIASTOLIC BLOOD PRESSURE: 60 MMHG | OXYGEN SATURATION: 97 %

## 2022-11-15 DIAGNOSIS — F41.1 ANXIETY STATE: ICD-10-CM

## 2022-11-15 DIAGNOSIS — R23.9 SKIN CHANGE: ICD-10-CM

## 2022-11-15 DIAGNOSIS — Z00.00 ANNUAL PHYSICAL EXAM: Primary | ICD-10-CM

## 2022-11-15 PROCEDURE — 3078F DIAST BP <80 MM HG: CPT | Performed by: FAMILY MEDICINE

## 2022-11-15 PROCEDURE — 3074F SYST BP LT 130 MM HG: CPT | Performed by: FAMILY MEDICINE

## 2022-11-15 PROCEDURE — 90686 IIV4 VACC NO PRSV 0.5 ML IM: CPT | Performed by: FAMILY MEDICINE

## 2022-11-15 PROCEDURE — 99395 PREV VISIT EST AGE 18-39: CPT | Performed by: FAMILY MEDICINE

## 2022-11-15 PROCEDURE — 90471 IMMUNIZATION ADMIN: CPT | Performed by: FAMILY MEDICINE

## 2022-11-15 PROCEDURE — 3008F BODY MASS INDEX DOCD: CPT | Performed by: FAMILY MEDICINE

## 2024-03-03 ENCOUNTER — TELEPHONE (OUTPATIENT)
Dept: FAMILY MEDICINE CLINIC | Facility: CLINIC | Age: 39
End: 2024-03-03

## 2024-03-03 DIAGNOSIS — F41.1 ANXIETY STATE: ICD-10-CM

## 2024-03-05 DIAGNOSIS — F41.1 ANXIETY STATE: ICD-10-CM

## 2024-03-05 RX ORDER — MIRTAZAPINE 15 MG/1
15 TABLET, FILM COATED ORAL NIGHTLY
Qty: 90 TABLET | Refills: 3 | Status: CANCELLED | OUTPATIENT
Start: 2024-03-05

## 2024-03-06 ENCOUNTER — PATIENT MESSAGE (OUTPATIENT)
Dept: FAMILY MEDICINE CLINIC | Facility: CLINIC | Age: 39
End: 2024-03-06

## 2024-03-06 DIAGNOSIS — F41.1 ANXIETY STATE: ICD-10-CM

## 2024-03-06 RX ORDER — MIRTAZAPINE 15 MG/1
15 TABLET, FILM COATED ORAL NIGHTLY
Qty: 30 TABLET | Refills: 0 | Status: SHIPPED | OUTPATIENT
Start: 2024-03-06

## 2024-03-06 NOTE — TELEPHONE ENCOUNTER
Requested Prescriptions     Pending Prescriptions Disp Refills    MIRTAZAPINE 15 MG Oral Tab [Pharmacy Med Name: MIRTAZAPINE 15MG TABLETS] 90 tablet 3     Sig: TAKE 1 TABLET(15 MG) BY MOUTH EVERY NIGHT     LOV 11/15/22    Patient was asked to follow-up in: 1 year    Appointment scheduled: Visit date not found     Please assist in scheduling appointment

## 2024-03-06 NOTE — TELEPHONE ENCOUNTER
Requested Prescriptions     Pending Prescriptions Disp Refills    mirtazapine 15 MG Oral Tab 90 tablet 3     Sig: Take 1 tablet (15 mg total) by mouth nightly.     LOV 11/15/22    Patient was asked to follow-up in: 1 year    Appointment scheduled: Visit date not found       Duplicate request- pt to schedule an appt- see other request

## 2024-03-06 NOTE — TELEPHONE ENCOUNTER
From: Jaiden Krishna  To: Amanuel Bob  Sent: 3/6/2024 10:48 AM CST  Subject: Rx status    Hello,    [facility] states they have not received a response regarding a refill request for mirtazapine that was entered over the weekend. Would it be possible to get this sent over today or tomorrow morning? I have one day left of this medication.    Thanks!    Filipe Krishna

## 2024-03-14 ENCOUNTER — TELEPHONE (OUTPATIENT)
Dept: FAMILY MEDICINE CLINIC | Facility: CLINIC | Age: 39
End: 2024-03-14

## 2024-03-14 DIAGNOSIS — Z00.00 LABORATORY EXAM ORDERED AS PART OF ROUTINE GENERAL MEDICAL EXAMINATION: Primary | ICD-10-CM

## 2024-03-14 NOTE — TELEPHONE ENCOUNTER
Please enter lab orders for the patient's upcoming physical appointment.     Physical scheduled:   Your appointments       Date & Time Appointment Department ([place])    Apr 02, 2024 10:30 AM CDT Adult Physical with Amanuel Bob MD [facility] ([facility])    PLEASE NOTE - Most insurances allow a Complete Physical once every 366 days. Please schedule accordingly.    Please arrive 15 minutes prior to your scheduled appointment. Please also bring your Insurance card, Photo ID, and your medication bottles or a list of your current medication.    If you no longer require this appointment, please contact your physician office to cancel.              [facility] [address]  [address] [address]  [phone]           Preferred lab: [facility] ([facility])     The patient has been notified to complete fasting labs prior to their physical appointment.

## 2024-03-14 NOTE — TELEPHONE ENCOUNTER
Please enter lab orders for the patient's upcoming physical appointment.     Physical scheduled:   Your appointments       Date & Time Appointment Department ([place])    Apr 02, 2024 10:30 AM CDT Adult Physical with Amanuel Bob MD [facility] ([facility])    PLEASE NOTE - Most insurances allow a Complete Physical once every 366 days. Please schedule accordingly.    Please arrive 15 minutes prior to your scheduled appointment. Please also bring your Insurance card, Photo ID, and your medication bottles or a list of your current medication.    If you no longer require this appointment, please contact your physician office to cancel.              [facility] [address]  [address] [address]  [phone]           Preferred lab: [facility] ([facility])     The patient has been notified to complete fasting labs prior to their physical appointment.

## 2024-03-20 ENCOUNTER — LAB ENCOUNTER (OUTPATIENT)
Dept: LAB | Age: 39
End: 2024-03-20
Attending: FAMILY MEDICINE
Payer: COMMERCIAL

## 2024-03-20 DIAGNOSIS — Z00.00 LABORATORY EXAM ORDERED AS PART OF ROUTINE GENERAL MEDICAL EXAMINATION: ICD-10-CM

## 2024-03-20 LAB
ALBUMIN SERPL-MCNC: 4.1 G/DL (ref 3.4–5)
ALBUMIN/GLOB SERPL: 1.2 {RATIO} (ref 1–2)
ALP LIVER SERPL-CCNC: 44 U/L
ALT SERPL-CCNC: 40 U/L
ANION GAP SERPL CALC-SCNC: 3 MMOL/L (ref 0–18)
AST SERPL-CCNC: 21 U/L (ref 15–37)
BILIRUB SERPL-MCNC: 0.4 MG/DL (ref 0.1–2)
BUN BLD-MCNC: 15 MG/DL (ref 9–23)
CALCIUM BLD-MCNC: 9.2 MG/DL (ref 8.5–10.1)
CHLORIDE SERPL-SCNC: 108 MMOL/L (ref 98–112)
CHOLEST SERPL-MCNC: 183 MG/DL (ref ?–200)
CO2 SERPL-SCNC: 27 MMOL/L (ref 21–32)
CREAT BLD-MCNC: 0.88 MG/DL
EGFRCR SERPLBLD CKD-EPI 2021: 114 ML/MIN/1.73M2 (ref 60–?)
ERYTHROCYTE [DISTWIDTH] IN BLOOD BY AUTOMATED COUNT: 12.8 %
EST. AVERAGE GLUCOSE BLD GHB EST-MCNC: 105 MG/DL (ref 68–126)
FASTING PATIENT LIPID ANSWER: YES
FASTING STATUS PATIENT QL REPORTED: YES
GLOBULIN PLAS-MCNC: 3.4 G/DL (ref 2.8–4.4)
GLUCOSE BLD-MCNC: 85 MG/DL (ref 70–99)
HBA1C MFR BLD: 5.3 % (ref ?–5.7)
HCT VFR BLD AUTO: 42.9 %
HDLC SERPL-MCNC: 37 MG/DL (ref 40–59)
HGB BLD-MCNC: 14.9 G/DL
LDLC SERPL CALC-MCNC: 110 MG/DL (ref ?–100)
MCH RBC QN AUTO: 31 PG (ref 26–34)
MCHC RBC AUTO-ENTMCNC: 34.7 G/DL (ref 31–37)
MCV RBC AUTO: 89.2 FL
NONHDLC SERPL-MCNC: 146 MG/DL (ref ?–130)
OSMOLALITY SERPL CALC.SUM OF ELEC: 286 MOSM/KG (ref 275–295)
PLATELET # BLD AUTO: 266 10(3)UL (ref 150–450)
POTASSIUM SERPL-SCNC: 3.7 MMOL/L (ref 3.5–5.1)
PROT SERPL-MCNC: 7.5 G/DL (ref 6.4–8.2)
RBC # BLD AUTO: 4.81 X10(6)UL
SODIUM SERPL-SCNC: 138 MMOL/L (ref 136–145)
TRIGL SERPL-MCNC: 204 MG/DL (ref 30–149)
TSI SER-ACNC: 1.43 MIU/ML (ref 0.36–3.74)
VLDLC SERPL CALC-MCNC: 35 MG/DL (ref 0–30)
WBC # BLD AUTO: 7.4 X10(3) UL (ref 4–11)

## 2024-03-20 PROCEDURE — 83036 HEMOGLOBIN GLYCOSYLATED A1C: CPT | Performed by: FAMILY MEDICINE

## 2024-03-20 PROCEDURE — 80050 GENERAL HEALTH PANEL: CPT | Performed by: FAMILY MEDICINE

## 2024-03-20 PROCEDURE — 80061 LIPID PANEL: CPT | Performed by: FAMILY MEDICINE

## 2024-03-28 RX ORDER — MIRTAZAPINE 15 MG/1
15 TABLET, FILM COATED ORAL NIGHTLY
Qty: 90 TABLET | Refills: 3 | OUTPATIENT
Start: 2024-03-28

## 2024-04-02 ENCOUNTER — OFFICE VISIT (OUTPATIENT)
Dept: FAMILY MEDICINE CLINIC | Facility: CLINIC | Age: 39
End: 2024-04-02
Payer: COMMERCIAL

## 2024-04-02 VITALS
HEART RATE: 94 BPM | RESPIRATION RATE: 16 BRPM | WEIGHT: 183.25 LBS | HEIGHT: 74 IN | SYSTOLIC BLOOD PRESSURE: 114 MMHG | OXYGEN SATURATION: 96 % | BODY MASS INDEX: 23.52 KG/M2 | DIASTOLIC BLOOD PRESSURE: 80 MMHG

## 2024-04-02 DIAGNOSIS — Z00.00 ANNUAL PHYSICAL EXAM: Primary | ICD-10-CM

## 2024-04-02 DIAGNOSIS — F41.1 ANXIETY STATE: ICD-10-CM

## 2024-04-02 PROCEDURE — 3008F BODY MASS INDEX DOCD: CPT | Performed by: FAMILY MEDICINE

## 2024-04-02 PROCEDURE — 99395 PREV VISIT EST AGE 18-39: CPT | Performed by: FAMILY MEDICINE

## 2024-04-02 PROCEDURE — 3079F DIAST BP 80-89 MM HG: CPT | Performed by: FAMILY MEDICINE

## 2024-04-02 PROCEDURE — 3074F SYST BP LT 130 MM HG: CPT | Performed by: FAMILY MEDICINE

## 2024-04-02 RX ORDER — BUSPIRONE HYDROCHLORIDE 5 MG/1
5 TABLET ORAL 2 TIMES DAILY
Qty: 60 TABLET | Refills: 2 | Status: SHIPPED | OUTPATIENT
Start: 2024-04-02

## 2024-04-02 RX ORDER — ALPRAZOLAM 0.25 MG/1
0.25 TABLET ORAL EVERY 6 HOURS PRN
Qty: 30 TABLET | Refills: 0 | Status: SHIPPED | OUTPATIENT
Start: 2024-04-02

## 2024-04-02 RX ORDER — MIRTAZAPINE 15 MG/1
15 TABLET, FILM COATED ORAL EVERY OTHER DAY
Qty: 15 TABLET | Refills: 0 | Status: SHIPPED | OUTPATIENT
Start: 2024-04-02 | End: 2024-05-02

## 2024-04-02 NOTE — PROGRESS NOTES
Chief Complaint   Patient presents with    Physical     Patient here for physical       HPI:   Jaiden Krishna is a 38 year old male who presents for a complete physical exam.     Last colonoscopy:  N/a - screening at 47yo   Last PSA:  N/a - screening at 51   Immunizations: TDaP 2016.      Anxiety - getting tired of the current combination of the meds.  On Remeron and hydroxyzine.  Hoping for something stronger that is more effective when he does have a panic attack.    Not having panic attacks very common.  Triggers are crowded situations.    Has been on lexapro in the past, but ineffective.  Had also been on sertraline.      Wt Readings from Last 6 Encounters:   04/02/24 183 lb 4 oz (83.1 kg)   11/15/22 179 lb (81.2 kg)   09/13/21 173 lb (78.5 kg)   04/13/21 160 lb (72.6 kg)   08/25/20 168 lb 3.2 oz (76.3 kg)   01/29/20 179 lb (81.2 kg)     Body mass index is 23.53 kg/m².     Chemistry Labs:   Lab Results   Component Value Date/Time    GLU 85 03/20/2024 08:59 AM     03/20/2024 08:59 AM    K 3.7 03/20/2024 08:59 AM     03/20/2024 08:59 AM    CO2 27.0 03/20/2024 08:59 AM    CREATSERUM 0.88 03/20/2024 08:59 AM    CA 9.2 03/20/2024 08:59 AM    ALB 4.1 03/20/2024 08:59 AM    TP 7.5 03/20/2024 08:59 AM    ALKPHO 44 (L) 03/20/2024 08:59 AM    AST 21 03/20/2024 08:59 AM    ALT 40 03/20/2024 08:59 AM    BILT 0.4 03/20/2024 08:59 AM          Cholesterol  (most recent labs)   Lab Results   Component Value Date/Time    CHOLEST 183 03/20/2024 08:59 AM    HDL 37 (L) 03/20/2024 08:59 AM     (H) 03/20/2024 08:59 AM    TRIG 204 (H) 03/20/2024 08:59 AM      No results found for: \"PSA\"      Current Outpatient Medications   Medication Sig Dispense Refill    mirtazapine 15 MG Oral Tab Take 1 tablet (15 mg total) by mouth nightly. 30 tablet 0    ZOLPIDEM TARTRATE ER 6.25 MG Oral Tab CR TAKE 1 TABLET BY MOUTH DAILY AS NEEDED 30 tablet 0    hydrOXYzine 25 MG Oral Tab Take 1 tablet (25 mg total) by mouth 2  (two) times daily as needed for Anxiety. 60 tablet 2      Past Medical History:   Diagnosis Date    Anxiety state, unspecified     Depression       Past Surgical History:   Procedure Laterality Date    OTHER SURGICAL HISTORY  2014    wisdom tooth/teeth & left side tooth extraction mid Dec. 2014      Family History   Problem Relation Age of Onset    Diabetes Paternal Grandfather     Stroke Paternal Grandfather     Cancer Neg     Heart Disease Neg       Social History:  Social History     Socioeconomic History    Marital status: Single   Occupational History    Occupation: IT   Tobacco Use    Smoking status: Former     Packs/day: 0.25     Years: 10.00     Additional pack years: 0.00     Total pack years: 2.50     Types: Cigarettes     Quit date: 2015     Years since quittin.8    Smokeless tobacco: Never   Vaping Use    Vaping Use: Never used   Substance and Sexual Activity    Alcohol use: Yes     Alcohol/week: 5.0 - 6.0 standard drinks of alcohol     Types: 5 - 6 Standard drinks or equivalent per week     Comment: 4 drinks a week    Drug use: No   Other Topics Concern    Caffeine Concern Yes     Comment: 1 tea daily    Sleep Concern Yes     Comment: wakes up multiple times nightly    Exercise Yes     Comment: 2 days a week    Seat Belt Yes      Occ: in between jobs. Computer work.   : engaged. Children: no.   Exercise: not currently.   Diet: not great, but better than in the past.      REVIEW OF SYSTEMS:     All systems reviewed, negative other than noted above.    EXAM:   /80   Pulse 94   Resp 16   Ht 6' 2\" (1.88 m)   Wt 183 lb 4 oz (83.1 kg)   SpO2 96%   BMI 23.53 kg/m²   Body mass index is 23.53 kg/m².     General appearance: alert, appears stated age and cooperative  Eyes: conjunctivae/corneas clear. PERRL, EOM's intact.   Ears: normal TM's and external ear canals both ears  Mouth - small red bump on inside of L cheek  Neck: no adenopathy, no JVD, supple, symmetrical, trachea midline  and thyroid not enlarged, symmetric, no tenderness/mass/nodules  Lungs: clear to auscultation bilaterally  Heart: S1, S2 normal, no murmur, click, rub or gallop, regular rate and rhythm  Abdomen: soft, non-tender; bowel sounds normal; no masses,  no organomegaly  Extremities: extremities normal, atraumatic, no cyanosis or edema  Pulses: 2+ and symmetric  Neurologic: Alert and oriented X 3, normal strength and tone. Normal symmetric reflexes. Normal coordination and gait     ASSESSMENT AND PLAN:     Jaiden Krishna was seen in the office today:  had concerns including Physical (Patient here for physical ).    1. Annual physical exam  Overall well  Working on diet, exercise  Main medical issue is the anxiety.     2. Anxiety state  Persists  On Remeron for a long time, but hoping to change it up  Has tried SSRIs in the past, without good results  Will wean down Remeron, and start Buspar  Discussed side effects  Start at 5mg BID  PRN alprazolam.  The hydroxyzine was ineffective  - busPIRone 5 MG Oral Tab; Take 1 tablet (5 mg total) by mouth in the morning and 1 tablet (5 mg total) before bedtime.  Dispense: 60 tablet; Refill: 2  - mirtazapine 15 MG Oral Tab; Take 1 tablet (15 mg total) by mouth every other day.  Dispense: 15 tablet; Refill: 0  - ALPRAZolam 0.25 MG Oral Tab; Take 1 tablet (0.25 mg total) by mouth every 6 (six) hours as needed for Anxiety.  Dispense: 30 tablet; Refill: 0        Amanuel Bob M.D.   [facility] 3  04/02/24

## 2024-05-18 ENCOUNTER — PATIENT MESSAGE (OUTPATIENT)
Dept: FAMILY MEDICINE CLINIC | Facility: CLINIC | Age: 39
End: 2024-05-18

## 2024-05-18 DIAGNOSIS — F41.1 ANXIETY STATE: ICD-10-CM

## 2024-05-20 RX ORDER — BUSPIRONE HYDROCHLORIDE 5 MG/1
5 TABLET ORAL 2 TIMES DAILY
Qty: 180 TABLET | Refills: 1 | Status: SHIPPED | OUTPATIENT
Start: 2024-05-20

## 2024-05-20 NOTE — TELEPHONE ENCOUNTER
From: Jaiden Krishna  To: Amanuel Bob  Sent: 5/18/2024 7:50 AM CDT  Subject: Rx refill    Hey Dr Bob,    I would like to refill the buspar at the same dose. Sleep is improving marginally and I'd like to give it another month.    Thanks!    Filipe Krishna

## 2024-05-20 NOTE — TELEPHONE ENCOUNTER
Refill request for:    Requested Prescriptions     Pending Prescriptions Disp Refills    busPIRone 5 MG Oral Tab 60 tablet      Sig: Take 1 tablet (5 mg total) by mouth in the morning and 1 tablet (5 mg total) before bedtime.        Last Prescribed Quantity Refills   4/2/24 60 2     LOV 4/2/2024     Patient was asked to follow-up in: 2 months    Appointment due: June 2024    Appointment scheduled: Visit date not found    Pt wants a refill of current dose of Buspar.    Routed to Dr Bob

## 2024-05-29 ENCOUNTER — PATIENT MESSAGE (OUTPATIENT)
Dept: FAMILY MEDICINE CLINIC | Facility: CLINIC | Age: 39
End: 2024-05-29

## 2024-05-29 NOTE — TELEPHONE ENCOUNTER
From: Jaiden Krishna  To: Amanuel Bob  Sent: 5/29/2024 2:43 PM CDT  Subject: Buspar    Hi Dr Bob,    While my sleep was improving when I requested the refill, improvement has stalled in the last week and I while I am able to sleep, I am not feeling rested in the morning and generally feel sleep deprived throughout the day. I'd like to discontinue the Buspar at this point and taper off.     As for next steps I would like to see how my anxiety is without a daily medication and with xanax as a \"break glass\" option for full-on panic attacks. My thinking is with the mirtazapine I was getting breakthrough anxiety anyways, and if going without a daily medication is a lateral movement in terms of anxiety but lacks the side effects of medication it sounds like a net positive to me. I have also been on daily medication for it for over a decade at this point and I'm curious what my baseline is without. If going without leads to regular, problematic anxiety I'm happy to try a new medication.    I'll hold off on any changes until I hear back, curious to hear your thoughts on the matter.    Thanks!    Filipe Krishna

## 2024-06-06 ENCOUNTER — OFFICE VISIT (OUTPATIENT)
Dept: FAMILY MEDICINE CLINIC | Facility: CLINIC | Age: 39
End: 2024-06-06
Payer: COMMERCIAL

## 2024-06-06 VITALS
HEART RATE: 68 BPM | BODY MASS INDEX: 22.59 KG/M2 | WEIGHT: 176 LBS | HEIGHT: 74 IN | SYSTOLIC BLOOD PRESSURE: 102 MMHG | RESPIRATION RATE: 12 BRPM | DIASTOLIC BLOOD PRESSURE: 60 MMHG

## 2024-06-06 DIAGNOSIS — K13.70 ORAL LESION: Primary | ICD-10-CM

## 2024-06-06 DIAGNOSIS — F41.1 ANXIETY STATE: ICD-10-CM

## 2024-06-06 PROCEDURE — 3074F SYST BP LT 130 MM HG: CPT | Performed by: FAMILY MEDICINE

## 2024-06-06 PROCEDURE — 3008F BODY MASS INDEX DOCD: CPT | Performed by: FAMILY MEDICINE

## 2024-06-06 PROCEDURE — 3078F DIAST BP <80 MM HG: CPT | Performed by: FAMILY MEDICINE

## 2024-06-06 PROCEDURE — 99213 OFFICE O/P EST LOW 20 MIN: CPT | Performed by: FAMILY MEDICINE

## 2024-06-06 RX ORDER — BUSPIRONE HYDROCHLORIDE 5 MG/1
5 TABLET ORAL EVERY MORNING
Qty: 90 TABLET | Refills: 3 | Status: SHIPPED | OUTPATIENT
Start: 2024-06-06

## 2024-06-06 RX ORDER — BUSPIRONE HYDROCHLORIDE 5 MG/1
5 TABLET ORAL 3 TIMES DAILY
COMMUNITY
End: 2024-06-06

## 2024-06-06 NOTE — PROGRESS NOTES
Chief Complaint   Patient presents with    Blisters     R inner cheek x 1 month      HPI:   Jaiden Krishna is a 38 year old male with PMH anxiety who presents to the office for eval of a lesion on the inner R cheek.  No symptoms, but has persisted.  Does note that he often chews on the inside of his cheek    Ears -for a few days was hearing a \"whooshing\" sound in his ears.  Bilateral  This was not rhythmic or related to heart beat  In the last several days it has gone away.  Hearing is normal    Anxiety - on Buspar, weaning now.  On 1 pill int ehA and this is effective.  Wishes to continue at this dose.       REVIEW OF SYSTEMS:   Pertinent items are noted in HPI.  EXAM:   /60 (BP Location: Left arm)   Pulse 68   Resp 12   Ht 6' 2\" (1.88 m)   Wt 176 lb (79.8 kg)   BMI 22.60 kg/m²     General appearance - alert, well appearing, and in no distress  Ears - bilateral TM's and external ear canals normal  Nose - normal and patent, no erythema, discharge or polyps  Mouth -on the left inner cheek where teeth joint, a small rounded raised lesion.  Flesh-colored, nontender.  No surrounding redness  ASSESSMENT AND PLAN:     Jaiden Krishna was seen in the office today:  had concerns including Blisters (R inner cheek x 1 month).    1. Oral lesion  Looks benign on exam  Suspect this is from chewing on the inner cheek as more of a nervous habit  Try to refrain from this.  Will keep an eye on any changes.  If it is still there in a month, see ENT    2. Anxiety state  We were weaning the BuSpar and he is down to once a day and likes the way he feels.  Wishes to continue the medicine at this dose.  - busPIRone 5 MG Oral Tab; Take 1 tablet (5 mg total) by mouth every morning.  Dispense: 90 tablet; Refill: 3      Amanuel Bob M.D.   [facility] 3  06/06/24

## 2024-08-15 DIAGNOSIS — F41.1 ANXIETY STATE: ICD-10-CM

## 2024-08-15 RX ORDER — ZOLPIDEM TARTRATE 6.25 MG/1
6.25 TABLET, FILM COATED, EXTENDED RELEASE ORAL DAILY PRN
Qty: 30 TABLET | Refills: 0 | Status: SHIPPED | OUTPATIENT
Start: 2024-08-15

## 2024-08-15 NOTE — TELEPHONE ENCOUNTER
Refill request for:    Requested Prescriptions     Pending Prescriptions Disp Refills    Zolpidem Tartrate ER 6.25 MG Oral Tab CR 30 tablet 0     Sig: Take 1 tablet (6.25 mg total) by mouth daily as needed.        Last Prescribed Quantity Refills   10/19/2022 30 0     LOV 6/6/2024     Patient was asked to follow-up in: Follow-up not documented in note    Appointment due:     Appointment scheduled: Visit date not found    Medication not on protocol.     Routed to Dr. Bob

## 2024-08-27 ENCOUNTER — TELEPHONE (OUTPATIENT)
Dept: FAMILY MEDICINE CLINIC | Facility: CLINIC | Age: 39
End: 2024-08-27

## 2024-08-27 DIAGNOSIS — F41.1 ANXIETY STATE: ICD-10-CM

## 2024-08-27 RX ORDER — ZOLPIDEM TARTRATE 6.25 MG/1
6.25 TABLET, FILM COATED, EXTENDED RELEASE ORAL DAILY PRN
Qty: 30 TABLET | Refills: 0 | Status: SHIPPED | OUTPATIENT
Start: 2024-08-27 | End: 2024-08-28

## 2024-08-27 RX ORDER — ONDANSETRON 4 MG/1
TABLET, ORALLY DISINTEGRATING ORAL
COMMUNITY
Start: 2024-08-17

## 2024-08-27 NOTE — TELEPHONE ENCOUNTER
Pt is calling to have his Zolpidem transferred to [facility] is out of stock. [id number] [place], [place] please send asap.

## 2024-08-28 RX ORDER — ZOLPIDEM TARTRATE 6.25 MG/1
6.25 TABLET, FILM COATED, EXTENDED RELEASE ORAL DAILY PRN
Qty: 30 TABLET | Refills: 0 | Status: CANCELLED | OUTPATIENT
Start: 2024-08-28

## 2024-08-28 RX ORDER — ZOLPIDEM TARTRATE 6.25 MG/1
6.25 TABLET, FILM COATED, EXTENDED RELEASE ORAL DAILY PRN
Qty: 30 TABLET | Refills: 0 | Status: SHIPPED | OUTPATIENT
Start: 2024-08-28

## 2024-08-28 NOTE — TELEPHONE ENCOUNTER
Patient call for Zolpidem transferred to [facility]/pharmacy #[id number] - [place], [place] - [address],  please send asap. Was sent to wrong pharmacy       Patient stated that his out of medication

## 2024-09-03 ENCOUNTER — OFFICE VISIT (OUTPATIENT)
Dept: FAMILY MEDICINE CLINIC | Facility: CLINIC | Age: 39
End: 2024-09-03
Payer: COMMERCIAL

## 2024-09-03 VITALS
OXYGEN SATURATION: 98 % | HEART RATE: 70 BPM | RESPIRATION RATE: 16 BRPM | WEIGHT: 164 LBS | SYSTOLIC BLOOD PRESSURE: 100 MMHG | DIASTOLIC BLOOD PRESSURE: 70 MMHG | BODY MASS INDEX: 21 KG/M2 | TEMPERATURE: 98 F

## 2024-09-03 DIAGNOSIS — R42 EPISODIC LIGHTHEADEDNESS: ICD-10-CM

## 2024-09-03 DIAGNOSIS — R14.0 POSTPRANDIAL ABDOMINAL BLOATING: Primary | ICD-10-CM

## 2024-09-03 PROCEDURE — 99213 OFFICE O/P EST LOW 20 MIN: CPT | Performed by: FAMILY MEDICINE

## 2024-09-03 NOTE — PROGRESS NOTES
Chief Complaint:  Chief Complaint   Patient presents with    Bloating     X1 week    Lightheadedness     X1 week        HPI:  This is a 38 year old male patient presenting for Bloating (X1 week) and Lightheadedness (X1 week )    Patient notes symptoms for the last 4 months.  Patient was taking mirtazapine 15 mg for approximately 10 years but wanted to try different medication.  PCP weaned from mirtazapine and started buspirone.  Patient has had significant side effects with buspirone including difficulty sleeping.  He began taking melatonin and Magtein (48mg magnesium and 666mg magnesium L-theonate).  2 months ago patient was tapered off buspirone.  Around this time symptoms started.  Initially symptoms started with sensation of feeling bloated and extended period time after some meals.  Tried Gas-X without relief.  This progressed to having fatigue and sensation of lightheadedness after eating in addition to bloating sensation.  Did not happen with every meal but was more common with spicy foods or if he ate or drink too quickly.  Added once daily Pepcid about a month ago.  Then on 8/24/2024 added Prilosec and increased Pepcid.  Symptoms became more significant starting on 8/27/2024.  Noted worsening fatigue, lightheadedness and weakness lasting for about an hour after a meal.  This was so significant he often have to lay down.  On 8/29/2024 he discontinued magnesium.  Then on 9/2/2024 he discontinued Pepcid and Prilosec.  Notes today is the first that he is actually feeling a little bit better.  Patient does note he has had an increase sensitivity to certain foods since symptoms started.  He is also noted some mild constipation and that he has firmer stools.  Still having daily bowel movement.  Also notes worsening gas.  Denies acid reflux, diarrhea, abdominal pain, nausea, vomiting.  Eats more carbs and dairy than anything else.  Does not eat a lot of meat.  Eats some fruits and vegetables.  Diet has not changed  in that regard.  In the past has had GI symptoms related to anxiety but this is different.  Has had a 20 pound weight loss but attributed this to stopping mirtazapine.    Health Maintenance:  Health Maintenance   Topic Date Due    COVID-19 Vaccine ( season) 2024    Influenza Vaccine (1) 10/01/2024    Annual Physical  2025    DTaP,Tdap,and Td Vaccines (2 - Td or Tdap) 2026    Annual Depression Screening  Completed    Pneumococcal Vaccine: Birth to 64yrs  Aged Out       ROS: Review of systems performed and negative unless stated in HPI.    Past medical, family and social history reviewed and listed as below.    HISTORY:  Past Medical History:    Anxiety state, unspecified    Depression      Past Surgical History:   Procedure Laterality Date    Other surgical history  2014    wisdom tooth/teeth & left side tooth extraction mid Dec. 2014      Family History   Problem Relation Age of Onset    Diabetes Paternal Grandfather     Stroke Paternal Grandfather     Cancer Neg     Heart Disease Neg       Social History     Socioeconomic History    Marital status: Single   Occupational History    Occupation: IT   Tobacco Use    Smoking status: Former     Current packs/day: 0.00     Average packs/day: 0.3 packs/day for 10.0 years (2.5 ttl pk-yrs)     Types: Cigarettes     Start date: 2005     Quit date: 2015     Years since quittin.2    Smokeless tobacco: Never   Vaping Use    Vaping status: Never Used   Substance and Sexual Activity    Alcohol use: Yes     Alcohol/week: 5.0 - 6.0 standard drinks of alcohol     Types: 5 - 6 Standard drinks or equivalent per week     Comment: 4 drinks a week    Drug use: Yes     Comment: LSD,    Sexual activity: Yes     Partners: Female   Other Topics Concern    Caffeine Concern Yes     Comment: 1 tea daily    Sleep Concern Yes     Comment: wakes up multiple times nightly    Exercise Yes     Comment: 2 days a week    Seat Belt Yes        Current Outpatient  Medications   Medication Sig Dispense Refill    Zolpidem Tartrate ER 6.25 MG Oral Tab CR Take 1 tablet (6.25 mg total) by mouth daily as needed. 30 tablet 0    ondansetron 4 MG Oral Tablet Dispersible DISSOLVE 1 TABLET BY MOUTH EVERY 4 HOURS AS NEEDED      ALPRAZolam 0.25 MG Oral Tab Take 1 tablet (0.25 mg total) by mouth every 6 (six) hours as needed for Anxiety. 30 tablet 0     Allergies:  Allergies   Allergen Reactions    Amoxicillin RASH    Lexapro [Escitalopram] OTHER (SEE COMMENTS)     \"felt awful\"    Seasonal        EXAM:  Vitals:    09/03/24 1417   BP: 100/70   Pulse: 70   Resp: 16   Temp: 98 °F (36.7 °C)   SpO2: 98%   Weight: 164 lb (74.4 kg)     GENERAL: vitals reviewed and listed above, alert, oriented, appears well hydrated and in no acute distress  HEENT: atraumatic, conjunctiva clear, no obvious abnormalities on inspection   LUNGS: clear to auscultation bilaterally, no wheezes, rales or rhonchi, good air movement  CV: HRRR, no murmurs, no peripheral edema   ABD: Soft, BSx4, non-tender, non-distended, no guarding or rebound tenderness  EXTREMITIES: warm and well perfused  PSYCH: pleasant and cooperative, no obvious depression or anxiety    ASSESSMENT AND PLAN:  Discussed the following assessment   Problem List Items Addressed This Visit    None  Visit Diagnoses       Postprandial abdominal bloating    -  Primary    Relevant Orders    CBC With Differential With Platelet    Ferritin    Folic Acid Serum (Folate)    Vitamin B12    Comp Metabolic Panel (14)    Hemoglobin A1C    Magnesium [E]    CELIAC DISEASE SCREEN Reflex [E]    Episodic lightheadedness        Relevant Orders    CBC With Differential With Platelet    Ferritin    Folic Acid Serum (Folate)    Vitamin B12    Comp Metabolic Panel (14)    Hemoglobin A1C    Magnesium [E]    CELIAC DISEASE SCREEN Reflex [E]            Advised the following:  Filipe was seen today for bloating and lightheadedness.    Diagnoses and all orders for this  visit:    Postprandial abdominal bloating  Episodic lightheadedness  Unclear etiology.  Will update and obtain some labs to look for underlying cause for evidence of malabsorption.  Fordiscussed this could be related to different medications possibly magnesium with worsening, contributed to by Pepcid or Prilosec.  Recommend starting a probiotic and if no improvement, will increase fluids and fiber.  If symptoms persistent and no obvious cause, will then refer to GI.  -     CBC With Differential With Platelet; Future  -     Ferritin; Future  -     Folic Acid Serum (Folate); Future  -     Vitamin B12; Future  -     Comp Metabolic Panel (14); Future  -     Hemoglobin A1C; Future  -     Magnesium [E]; Future  -     CELIAC DISEASE SCREEN Reflex [E]; Future    Will notify of results.   Polly Woodall DO  9/3/2024 2:26 PM  Family Medicine    Note to Patient  The 21st Century Cures Act makes medical notes like these available to patients in the interest of transparency. However, be advised this is a medical document and is intended as [id number] communication; it is written in medical language and may appear blunt, direct, or contain abbreviations or verbiage that are unfamiliar. Medical documents are intended to carry relevant information, facts as evident, and the clinical opinion of the practitioner.     This report has been produced using speech recognition software, and may contain errors related to grammar, punctuation, spelling, words or phrases unrecognized or not translated appropriately to text; these errors may be referred to the dictating provider for further clarification and/or addendum as needed.

## 2024-09-04 ENCOUNTER — LAB ENCOUNTER (OUTPATIENT)
Dept: LAB | Age: 39
End: 2024-09-04
Attending: FAMILY MEDICINE
Payer: COMMERCIAL

## 2024-09-04 DIAGNOSIS — R14.0 POSTPRANDIAL ABDOMINAL BLOATING: ICD-10-CM

## 2024-09-04 DIAGNOSIS — R42 EPISODIC LIGHTHEADEDNESS: ICD-10-CM

## 2024-09-04 LAB
ALBUMIN SERPL-MCNC: 5.4 G/DL (ref 3.2–4.8)
ALBUMIN/GLOB SERPL: 1.7 {RATIO} (ref 1–2)
ALP LIVER SERPL-CCNC: 58 U/L
ALT SERPL-CCNC: 27 U/L
ANION GAP SERPL CALC-SCNC: 16 MMOL/L (ref 0–18)
AST SERPL-CCNC: 20 U/L (ref ?–34)
BASOPHILS # BLD AUTO: 0.06 X10(3) UL (ref 0–0.2)
BASOPHILS NFR BLD AUTO: 0.6 %
BILIRUB SERPL-MCNC: 0.6 MG/DL (ref 0.3–1.2)
BUN BLD-MCNC: 17 MG/DL (ref 9–23)
CALCIUM BLD-MCNC: 10.2 MG/DL (ref 8.7–10.4)
CHLORIDE SERPL-SCNC: 105 MMOL/L (ref 98–112)
CO2 SERPL-SCNC: 19 MMOL/L (ref 21–32)
CREAT BLD-MCNC: 0.97 MG/DL
DEPRECATED HBV CORE AB SER IA-ACNC: 95 NG/ML
EGFRCR SERPLBLD CKD-EPI 2021: 103 ML/MIN/1.73M2 (ref 60–?)
EOSINOPHIL # BLD AUTO: 0.16 X10(3) UL (ref 0–0.7)
EOSINOPHIL NFR BLD AUTO: 1.6 %
ERYTHROCYTE [DISTWIDTH] IN BLOOD BY AUTOMATED COUNT: 12.5 %
EST. AVERAGE GLUCOSE BLD GHB EST-MCNC: 103 MG/DL (ref 68–126)
FASTING STATUS PATIENT QL REPORTED: NO
FOLATE SERPL-MCNC: 45.6 NG/ML (ref 5.4–?)
GLOBULIN PLAS-MCNC: 3.1 G/DL (ref 2–3.5)
GLUCOSE BLD-MCNC: 74 MG/DL (ref 70–99)
HBA1C MFR BLD: 5.2 % (ref ?–5.7)
HCT VFR BLD AUTO: 43 %
HGB BLD-MCNC: 15.1 G/DL
IGA SERPL-MCNC: 312.6 MG/DL (ref 70–312)
IMM GRANULOCYTES # BLD AUTO: 0.03 X10(3) UL (ref 0–1)
IMM GRANULOCYTES NFR BLD: 0.3 %
LYMPHOCYTES # BLD AUTO: 3.6 X10(3) UL (ref 1–4)
LYMPHOCYTES NFR BLD AUTO: 36.3 %
MAGNESIUM SERPL-MCNC: 2.1 MG/DL (ref 1.6–2.6)
MCH RBC QN AUTO: 30.9 PG (ref 26–34)
MCHC RBC AUTO-ENTMCNC: 35.1 G/DL (ref 31–37)
MCV RBC AUTO: 87.9 FL
MONOCYTES # BLD AUTO: 0.67 X10(3) UL (ref 0.1–1)
MONOCYTES NFR BLD AUTO: 6.7 %
NEUTROPHILS # BLD AUTO: 5.41 X10 (3) UL (ref 1.5–7.7)
NEUTROPHILS # BLD AUTO: 5.41 X10(3) UL (ref 1.5–7.7)
NEUTROPHILS NFR BLD AUTO: 54.5 %
OSMOLALITY SERPL CALC.SUM OF ELEC: 290 MOSM/KG (ref 275–295)
PLATELET # BLD AUTO: 291 10(3)UL (ref 150–450)
POTASSIUM SERPL-SCNC: 3.9 MMOL/L (ref 3.5–5.1)
PROT SERPL-MCNC: 8.5 G/DL (ref 5.7–8.2)
RBC # BLD AUTO: 4.89 X10(6)UL
SODIUM SERPL-SCNC: 140 MMOL/L (ref 136–145)
VIT B12 SERPL-MCNC: 726 PG/ML (ref 211–911)
WBC # BLD AUTO: 9.9 X10(3) UL (ref 4–11)

## 2024-09-04 PROCEDURE — 86364 TISS TRNSGLTMNASE EA IG CLAS: CPT

## 2024-09-04 PROCEDURE — 83036 HEMOGLOBIN GLYCOSYLATED A1C: CPT

## 2024-09-04 PROCEDURE — 82728 ASSAY OF FERRITIN: CPT

## 2024-09-04 PROCEDURE — 36415 COLL VENOUS BLD VENIPUNCTURE: CPT

## 2024-09-04 PROCEDURE — 85025 COMPLETE CBC W/AUTO DIFF WBC: CPT

## 2024-09-04 PROCEDURE — 82607 VITAMIN B-12: CPT

## 2024-09-04 PROCEDURE — 82784 ASSAY IGA/IGD/IGG/IGM EACH: CPT

## 2024-09-04 PROCEDURE — 83735 ASSAY OF MAGNESIUM: CPT

## 2024-09-04 PROCEDURE — 80053 COMPREHEN METABOLIC PANEL: CPT

## 2024-09-04 PROCEDURE — 82746 ASSAY OF FOLIC ACID SERUM: CPT

## 2024-09-06 LAB — TTG IGA SER-ACNC: 0.7 U/ML (ref ?–7)

## 2024-09-16 ENCOUNTER — PATIENT MESSAGE (OUTPATIENT)
Dept: FAMILY MEDICINE CLINIC | Facility: CLINIC | Age: 39
End: 2024-09-16

## 2024-09-16 NOTE — TELEPHONE ENCOUNTER
From: Jaiden Krishna  To: Polly Woodall  Sent: 9/16/2024 11:23 AM CDT  Subject: Blood work follow up    Hi Dr. Woodall,    I never received any communication regarding my blood work results. Was there anything worthy of concern or that would potentially explain my stomach issues?    Thanks!    Filipe Krishna

## 2024-11-19 ENCOUNTER — OFFICE VISIT (OUTPATIENT)
Dept: FAMILY MEDICINE CLINIC | Facility: CLINIC | Age: 39
End: 2024-11-19
Payer: COMMERCIAL

## 2024-11-19 VITALS
DIASTOLIC BLOOD PRESSURE: 80 MMHG | BODY MASS INDEX: 22.01 KG/M2 | SYSTOLIC BLOOD PRESSURE: 120 MMHG | HEIGHT: 74 IN | HEART RATE: 85 BPM | RESPIRATION RATE: 16 BRPM | OXYGEN SATURATION: 96 % | WEIGHT: 171.5 LBS

## 2024-11-19 DIAGNOSIS — M79.601 RIGHT ARM PAIN: Primary | ICD-10-CM

## 2024-11-19 PROCEDURE — 99213 OFFICE O/P EST LOW 20 MIN: CPT | Performed by: FAMILY MEDICINE

## 2024-11-19 RX ORDER — LORAZEPAM 0.5 MG/1
0.5 TABLET ORAL DAILY PRN
COMMUNITY
Start: 2024-11-02

## 2024-11-19 RX ORDER — MIRTAZAPINE 7.5 MG/1
7.5 TABLET, FILM COATED ORAL NIGHTLY
COMMUNITY
Start: 2024-10-30

## 2024-11-19 NOTE — PROGRESS NOTES
Chief Complaint   Patient presents with    Musculoskeletal Problem     Patient here for hand,arm and elbow pain      HPI:   Jaiden Krishna is a 38 year old male who presents to the office for intermittent R arm pain.    Can be funny bone, wrist and between the fingers in the palm.   The pain is described as burning type of pain.      When he is active, this does not hurt the situation.   Present a month and a half now  Cannot recall any inciting events or triggers.     Not every day  Never on the L side.     When the pain is present he is a little weaker.        REVIEW OF SYSTEMS:   Pertinent items are noted in HPI.  EXAM:   There were no vitals taken for this visit.    General appearance - alert, well appearing, and in no distress  Extremities - normal sensation.  Full range of motion in elbow wrist and hands.  Strength 5 out of 5 throughout the arm.  Muscle bulk normal  ASSESSMENT AND PLAN:     Jaiden Krishna was seen in the office today:  had concerns including Musculoskeletal Problem (Patient here for right hand,arm and elbow pain started 1.5 months ago).    1. 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  Free time is often spent on computers or playing video games  Also working on mechanics and active with his hands  Need to take a break from some of this.  Extraction the arms, resting.  Recommend using ibuprofen 3 times daily with meals for the next several days  If symptoms persist, would recommend seeing the arm orthopedic team for EMG and additional testing  No weakness on exam, or classic presentation of nerve impingements.       Amanuel Bob M.D.   [facility] 3  11/19/24

## 2025-03-27 ENCOUNTER — OFFICE VISIT (OUTPATIENT)
Dept: FAMILY MEDICINE CLINIC | Facility: CLINIC | Age: 40
End: 2025-03-27
Payer: COMMERCIAL

## 2025-03-27 VITALS
RESPIRATION RATE: 16 BRPM | BODY MASS INDEX: 22.84 KG/M2 | TEMPERATURE: 97 F | HEIGHT: 74 IN | DIASTOLIC BLOOD PRESSURE: 76 MMHG | OXYGEN SATURATION: 98 % | WEIGHT: 178 LBS | SYSTOLIC BLOOD PRESSURE: 112 MMHG | HEART RATE: 76 BPM

## 2025-03-27 DIAGNOSIS — J01.00 ACUTE NON-RECURRENT MAXILLARY SINUSITIS: Primary | ICD-10-CM

## 2025-03-27 PROCEDURE — 99213 OFFICE O/P EST LOW 20 MIN: CPT | Performed by: NURSE PRACTITIONER

## 2025-03-27 RX ORDER — DOXYCYCLINE 100 MG/1
100 CAPSULE ORAL 2 TIMES DAILY
Qty: 14 CAPSULE | Refills: 0 | Status: SHIPPED | OUTPATIENT
Start: 2025-03-27 | End: 2025-04-03

## 2025-03-27 NOTE — PATIENT INSTRUCTIONS
Doxycycline as prescribed. Take with food. This antibiotic can cause sun sensitivity. Be sure to wear sun protection.   Continue flonase  Follow up for any new/ worsening symptoms   Follow up with your primary care doctor if symptoms are not improving over the next 3-4 days.

## 2025-03-27 NOTE — PROGRESS NOTES
CHIEF COMPLAINT:     Chief Complaint   Patient presents with    Sinus Problem     Was sick x 2 weeks beginning of the month, pain behind L cheek bone and nostril started last night, mucus, OTC ibuprofen       HPI:   Jaiden Krishna is a 39 year old male who presents for sinus congestion and cough starting 2 weeks ago. Symptoms seemed to be improving. Then last night felt return of worsening sinus congestion and sinus pressure in the left maxillary area. Treating symptoms with flonase, warm steam, and tylenol/ ibuprofen.      Current Outpatient Medications   Medication Sig Dispense Refill    doxycycline 100 MG Oral Cap Take 1 capsule (100 mg total) by mouth 2 (two) times daily for 7 days. 14 capsule 0    LORazepam 0.5 MG Oral Tab Take 1 tablet (0.5 mg total) by mouth daily as needed.      mirtazapine 7.5 MG Oral Tab Take 1 tablet (7.5 mg total) by mouth nightly. TAKE AT BEDTIME      Zolpidem Tartrate ER 6.25 MG Oral Tab CR Take 1 tablet (6.25 mg total) by mouth daily as needed. 30 tablet 0    ALPRAZolam 0.25 MG Oral Tab Take 1 tablet (0.25 mg total) by mouth every 6 (six) hours as needed for Anxiety. 30 tablet 0      Past Medical History:    Anxiety state, unspecified    Depression      Past Surgical History:   Procedure Laterality Date    Other surgical history  2014    wisdom tooth/teeth & left side tooth extraction mid Dec. 2014         Social History     Socioeconomic History    Marital status: Single   Occupational History    Occupation: IT   Tobacco Use    Smoking status: Former     Current packs/day: 0.00     Average packs/day: 0.3 packs/day for 10.0 years (2.5 ttl pk-yrs)     Types: Cigarettes     Start date: 2005     Quit date: 2015     Years since quittin.8    Smokeless tobacco: Never   Vaping Use    Vaping status: Never Used   Substance and Sexual Activity    Alcohol use: Yes     Alcohol/week: 5.0 - 6.0 standard drinks of alcohol     Types: 5 - 6 Standard drinks or equivalent per  week     Comment: 4 drinks a week    Drug use: Yes     Comment: LSD,    Sexual activity: Yes     Partners: Female   Other Topics Concern    Caffeine Concern Yes     Comment: 1 tea daily    Sleep Concern Yes     Comment: wakes up multiple times nightly    Exercise Yes     Comment: 2 days a week    Seat Belt Yes         REVIEW OF SYSTEMS:   GENERAL: normal appetite  SKIN: no rashes or abnormal skin lesions  HEENT: See HPI  LUNGS: denies shortness of breath or wheezing, See HPI  CARDIOVASCULAR: denies chest pain or palpitations   GI: denies N/V/C or abdominal pain  NEURO: Denies dizziness or weakness    EXAM:   /76   Pulse 76   Temp 97.4 °F (36.3 °C)   Resp 16   Ht 6' 2\" (1.88 m)   Wt 178 lb (80.7 kg)   SpO2 98%   BMI 22.85 kg/m²   GENERAL: well developed, well nourished,in no apparent distress  SKIN: no rashes,no suspicious lesions  HEAD: atraumatic, normocephalic.  + tenderness on palpation of maxillary sinuses  EYES: conjunctiva clear, EOM intact  EARS: TM's gray, no bulging, no retraction,no fluid, bony landmarks visible  NOSE: Nostrils patent, + nasal discharge, nasal mucosa erythematous   THROAT: Oral mucosa pink, moist. Posterior pharynx is non erythematous. no exudates.   NECK: Supple, non-tender  LUNGS: clear to auscultation bilaterally, no wheezes or rhonchi. Breathing is non labored.  CARDIO: RRR without murmur  EXTREMITIES: no cyanosis, clubbing or edema  LYMPH:  no lymphadenopathy.        ASSESSMENT AND PLAN:   Jaiden Krishna is a 39 year old male who presents with upper respiratory symptoms that are consistent with    ASSESSMENT:   Encounter Diagnosis   Name Primary?    Acute non-recurrent maxillary sinusitis Yes         PLAN:   Meds as below.   Comfort care per pt instructions.   To follow up for any new or worsening symptoms or if not improving the next few days.       Meds & Refills for this Visit:  Requested Prescriptions     Signed Prescriptions Disp Refills    doxycycline 100  MG Oral Cap 14 capsule 0     Sig: Take 1 capsule (100 mg total) by mouth 2 (two) times daily for 7 days.       Risks, benefits, and side effects of medication explained and discussed.    Patient Instructions   Doxycycline as prescribed. Take with food. This antibiotic can cause sun sensitivity. Be sure to wear sun protection.   Continue flonase  Follow up for any new/ worsening symptoms   Follow up with your primary care doctor if symptoms are not improving over the next 3-4 days.     The patient indicates understanding of these issues and agrees to the plan.  The patient is asked to return if sx's persist or worsen.

## 2025-06-18 ENCOUNTER — OFFICE VISIT (OUTPATIENT)
Dept: FAMILY MEDICINE CLINIC | Facility: CLINIC | Age: 40
End: 2025-06-18
Payer: COMMERCIAL

## 2025-06-18 VITALS
SYSTOLIC BLOOD PRESSURE: 120 MMHG | HEIGHT: 74 IN | DIASTOLIC BLOOD PRESSURE: 82 MMHG | RESPIRATION RATE: 16 BRPM | WEIGHT: 183.5 LBS | HEART RATE: 92 BPM | BODY MASS INDEX: 23.55 KG/M2 | OXYGEN SATURATION: 97 %

## 2025-06-18 DIAGNOSIS — R07.9 LEFT-SIDED CHEST PAIN: Primary | ICD-10-CM

## 2025-06-18 LAB
ATRIAL RATE: 70 BPM
P AXIS: 70 DEGREES
P-R INTERVAL: 132 MS
Q-T INTERVAL: 376 MS
QRS DURATION: 92 MS
QTC CALCULATION (BEZET): 406 MS
R AXIS: 60 DEGREES
T AXIS: 39 DEGREES
VENTRICULAR RATE: 70 BPM

## 2025-06-18 PROCEDURE — 99214 OFFICE O/P EST MOD 30 MIN: CPT | Performed by: FAMILY MEDICINE

## 2025-06-18 PROCEDURE — 93000 ELECTROCARDIOGRAM COMPLETE: CPT | Performed by: FAMILY MEDICINE

## 2025-06-18 NOTE — PROGRESS NOTES
Chief Complaint   Patient presents with    Chest Pain     Patient here for intermittent pain/twinge across chest      HPI:   Jaiden Krishna is a 39 year old male with PMH anxiety who presents to the office for evaluation of chest tightness.    Intermittent tightness across the chest.  Feels like its related to movement or breaths.  Feels superficial under the L breast/chest area.    Not tied to physical exertion.  Mainly occurs when sitting at his work desk.   Also occurs on weekend.    Worse in the AM.    Almost feels like a muscle spasms or a twinge.  Mild - 2-3/10.      Present about a week.  Has not occurred today.    Did some stretching yesterday.      Anxiety up a little in the past few weeks.       REVIEW OF SYSTEMS:   Pertinent items are noted in HPI.  No other chest pains, no SOB.    Anxiety up and down.  Especially of late with the world situation, and the ICE raids.   EXAM:   /82   Pulse 92   Resp 16   Ht 6' 2\" (1.88 m)   Wt 183 lb 8 oz (83.2 kg)   SpO2 97%   BMI 23.56 kg/m²     General appearance - alert, well appearing, and in no distress  Chest - clear to auscultation, no wheezes, rales or rhonchi, symmetric air entry  Chest wall - non tender.  Not able to reproduce the symptoms.   Heart - normal rate, regular rhythm, normal S1, S2, no murmurs, rubs, clicks or gallops  Abdomen - soft, nontender, nondistended, no masses or organomegaly    EKG: Normal sinus rhythm   Normal ECG   When compared with ECG of 17-AUG-2024 12:50,   No significant change was found   ASSESSMENT AND PLAN:     Jaiden Krishna was seen in the office today:  had concerns including Chest Pain (Patient here for intermittent pain/twinge across chest).    1. Left-sided chest pain  Sounds more MSK than cardiac.  EKG done, and is reassuring  Anxiety also picked up of late.   Recommend stretching, ibuprofen to help.  Possible costochondritis   Treat with high dose ibuprofen - 600-800mg.  With food.   - EKG  with interpretation and Report -IN OFFICE [[id number]]          Amanuel Bob M.D.   EMG 3  06/18/25        Note to patient: The 21 Century Cures Act makes medical notes like these available to patients in the interest of transparency. However, be advised this is a medical document. It is intended as peer to peer communication. It is written in medical language and may contain abbreviations or verbiage that are unfamiliar. It may appear blunt or direct. Medical documents are intended to carry relevant information, facts as evident, and the clinical opinion of the practitioner.

## 2025-07-28 ENCOUNTER — OFFICE VISIT (OUTPATIENT)
Dept: DERMATOLOGY CLINIC | Facility: CLINIC | Age: 40
End: 2025-07-28
Payer: COMMERCIAL

## 2025-07-28 DIAGNOSIS — D22.39 FIBROUS PAPULE OF NOSE: Primary | ICD-10-CM

## 2025-07-28 DIAGNOSIS — A63.0 CONDYLOMA ACUMINATUM: ICD-10-CM

## 2025-07-28 NOTE — ADDENDUM NOTE
Addended by: ALBERTINA ECHEVARRIA on: 7/28/2025 02:07 PM     Modules accepted: Level of Service

## 2025-07-28 NOTE — PROGRESS NOTES
New Patient    Referred by: No referring provider defined for this encounter.    CHIEF COMPLAINT: Lesion of concern     HISTORY OF PRESENT ILLNESS: Jaiden Krishna is a 39 year old male here for evaluation of lesion of concern.    1. Growth   Location: nose right  Duration: over 1 year  Bleeding, growing, changing?: No  Scaly?:No    Itchy?:No    Current treatment: none  Past treatments: none    2. Growth   Location: penis  Duration: over 1 year  Bleeding, growing, changing?: No  Scaly?:No    Itchy?:No    Current treatment: none  Past treatments: none    Personal Dermatologic History  History of skin cancer: No  History of  atypical moles: No    FAMILY HISTORY:  History of melanoma: Yes, paternal grandfather    Past Medical History  Past Medical History[1]    REVIEW OF SYSTEMS:  Constitutional: Denies fever, chills, unintentional weight loss.   Skin as per HPI    Medications  Current Medications[2]    PHYSICAL EXAM:  General: awake, alert, no acute distress  Neuropsych: appropriate mood and affect  Eyes: Sclerae anicteric, without conjunctival injection, eyelids unremarkable  Skin: Skin exam was performed today including the following: Face and penis. Pertinent findings include:   - Nose with a pink fibrous papule    ASSESSMENT & PLAN:  Pathophysiology of diagnoses discussed with patient.  Therapeutic options reviewed. Risks, benefits, and alternatives discussed with patient. Instructions reviewed at length.    #Fibrous papule  #scar   - Reassured regarding benign nature. No treatment necessary unless symptomatic/changing.    #Condyloma Acuminatum  - Discussed viral etiology (HPV)  - Discussed transmissible potential, recommended use of condoms though stressed that this cannot completely protect against HPV transmission. Recommended evaluation of any sexual partners.  - Treatment options discussed. Patient elects cryotherapy today.     Procedure Note Cryosurgery of benign lesion(s)  Risks, benefits,  alternatives, complications, and personnel required for cryosurgery reviewed with patient. Specifically, the risks of permanent scar, loss or darkening of skin color, blister and recurrence of lesion were discussed. Pt verbalizes understanding and wishes to proceed.     Cryosurgery performed with Liquid Nitrogen via cryostat spray gun to warts . 1 lesion(s) treated.     Patient tolerated well. Post-op course explained and wound care instructions given.        Return to clinic: 4 weeks or sooner if something concerning arises     Ronny Hernandez MD    By signing my name below, IJd MA,  attest that this documentation has been prepared under the direction and in the presence of Ronny Hernandez MD.   Electronically Signed: Jd CASTILLO MA, 7/28/2025, 1:45 PM.    IRonny MD,  personally performed the services described in this documentation. All medical record entries made by the scribe were at my direction and in my presence.  I have reviewed the chart and agree that the record reflects my personal performance and is accurate and complete.  Ronny Hernandez MD, 7/28/2025, 2:06 PM           [1]   Past Medical History:   Anxiety state, unspecified    Depression   [2]   Current Outpatient Medications   Medication Sig Dispense Refill    NON FORMULARY Take 200 mg by mouth in the morning. Ketamine 200mg oral.      LORazepam 0.5 MG Oral Tab Take 1 tablet (0.5 mg total) by mouth daily as needed.      mirtazapine 7.5 MG Oral Tab Take 1 tablet (7.5 mg total) by mouth nightly. TAKE AT BEDTIME      Zolpidem Tartrate ER 6.25 MG Oral Tab CR Take 1 tablet (6.25 mg total) by mouth daily as needed. 30 tablet 0    ALPRAZolam 0.25 MG Oral Tab Take 1 tablet (0.25 mg total) by mouth every 6 (six) hours as needed for Anxiety. 30 tablet 0

## 2025-08-25 ENCOUNTER — OFFICE VISIT (OUTPATIENT)
Dept: DERMATOLOGY CLINIC | Facility: CLINIC | Age: 40
End: 2025-08-25

## 2025-08-25 DIAGNOSIS — A63.0 CONDYLOMA ACUMINATUM: Primary | ICD-10-CM

## 2025-08-25 PROCEDURE — 99213 OFFICE O/P EST LOW 20 MIN: CPT | Performed by: STUDENT IN AN ORGANIZED HEALTH CARE EDUCATION/TRAINING PROGRAM

## (undated) NOTE — MR AVS SNAPSHOT
[facility] [address] [facility] [address] [facility]  [place] [id number] [phone]               Thank you for choosing us for your health care visit with VINOD Quijano. We are glad to serve you and happy to provide you with this summary of your visit.   Please h 1 spray by Each Nare route 2 (two) times daily. Commonly known as:  FLONASE           PROBIOTIC DAILY OR   Take  by mouth.            REMERON 30 MG Tabs   Generic drug:  mirtazapine           temazepam 15 MG Caps   Take 1 capsule (15 mg total) by mouth ni

## (undated) NOTE — MR AVS SNAPSHOT
[address] [facility] [address]  [facility],  [address]  [address] [place] [address] [phone]               Thank you for choosing us for your health care visit with Sarah Gamino MD.  We are glad to serve you and happy to provide you with this s Summaries. If you've been to the Emergency Department or your doctor's office, you can view your past visit information in [employer] by going to Visits < Visit Summaries. [employer] questions? Call [phone] for help.   [employer] is NOT to be used for urge